# Patient Record
Sex: FEMALE | Race: WHITE | NOT HISPANIC OR LATINO | Employment: FULL TIME | URBAN - METROPOLITAN AREA
[De-identification: names, ages, dates, MRNs, and addresses within clinical notes are randomized per-mention and may not be internally consistent; named-entity substitution may affect disease eponyms.]

---

## 2019-08-13 ENCOUNTER — OFFICE VISIT (OUTPATIENT)
Dept: DERMATOLOGY | Facility: CLINIC | Age: 24
End: 2019-08-13
Payer: COMMERCIAL

## 2019-08-13 VITALS — HEART RATE: 101 BPM | SYSTOLIC BLOOD PRESSURE: 108 MMHG | OXYGEN SATURATION: 97 % | DIASTOLIC BLOOD PRESSURE: 73 MMHG

## 2019-08-13 DIAGNOSIS — L73.2 HIDRADENITIS SUPPURATIVA: Primary | ICD-10-CM

## 2019-08-13 PROCEDURE — 99203 OFFICE O/P NEW LOW 30 MIN: CPT | Performed by: DERMATOLOGY

## 2019-08-13 RX ORDER — CLINDAMYCIN AND BENZOYL PEROXIDE 10; 50 MG/G; MG/G
GEL TOPICAL 2 TIMES DAILY
Qty: 50 G | Refills: 11 | Status: SHIPPED | OUTPATIENT
Start: 2019-08-13 | End: 2021-02-15

## 2019-08-13 RX ORDER — TRAZODONE HYDROCHLORIDE 100 MG/1
100 TABLET ORAL
COMMUNITY
End: 2022-10-03

## 2019-08-13 RX ORDER — DOXYCYCLINE 100 MG/1
100 CAPSULE ORAL 2 TIMES DAILY
Qty: 60 CAPSULE | Refills: 3 | Status: SHIPPED | OUTPATIENT
Start: 2019-08-13 | End: 2021-02-18

## 2019-08-13 RX ORDER — SUMATRIPTAN 50 MG/1
50 TABLET, FILM COATED ORAL PRN
COMMUNITY
End: 2022-10-03

## 2019-08-13 NOTE — PROGRESS NOTES
Sugar Bonilla , a 23 year old year old female patient, I was asked to see by Antonina Miranda for hidradneitis.  Patient states this has been present for years.  Patient reports the following symptoms:  Cysts in groin and axilla.   .  Patient reports the following previous treatments clinda.  Patient reports the following modifying factors none.  Associated symptoms: none. She does smoke.  .  Patient has no other skin complaints today.  Remainder of the HPI, Meds, PMH, Allergies, FH, and SH was reviewed in chart.    No past medical history on file.    No past surgical history on file.     No family history on file.    Social History     Socioeconomic History     Marital status: Single     Spouse name: Not on file     Number of children: Not on file     Years of education: Not on file     Highest education level: Not on file   Occupational History     Not on file   Social Needs     Financial resource strain: Not on file     Food insecurity:     Worry: Not on file     Inability: Not on file     Transportation needs:     Medical: Not on file     Non-medical: Not on file   Tobacco Use     Smoking status: Not on file   Substance and Sexual Activity     Alcohol use: Not on file     Drug use: Not on file     Sexual activity: Not on file   Lifestyle     Physical activity:     Days per week: Not on file     Minutes per session: Not on file     Stress: Not on file   Relationships     Social connections:     Talks on phone: Not on file     Gets together: Not on file     Attends Church service: Not on file     Active member of club or organization: Not on file     Attends meetings of clubs or organizations: Not on file     Relationship status: Not on file     Intimate partner violence:     Fear of current or ex partner: Not on file     Emotionally abused: Not on file     Physically abused: Not on file     Forced sexual activity: Not on file   Other Topics Concern     Not on file   Social History Narrative     Not on file        No outpatient encounter medications on file as of 8/13/2019.     No facility-administered encounter medications on file as of 8/13/2019.              Review Of Systems  Skin: As above  Eyes: negative  Ears/Nose/Throat: negative  Respiratory: No shortness of breath, dyspnea on exertion, cough, or hemoptysis  Cardiovascular: negative  Gastrointestinal: negative  Genitourinary: negative  Musculoskeletal: negative  Neurologic: negative  Psychiatric: negative  Hematologic/Lymphatic/Immunologic: negative  Endocrine: negative      O:   NAD, WDWN, Alert & Oriented, Mood & Affect wnl, Vitals stable   Here today alone   There were no vitals taken for this visit.   General appearance arturo ii   Vitals stable   Alert, oriented and in no acute distress      Inflammatory nodules in axilla and ab     The remainder of expanded problem focused exam was unremarkable; the following areas were examined:  scalp/hair, conjunctiva/lids, face, neck, lips, chest, digits/nails, RUE, LUE.      Eyes: Conjunctivae/lids:Normal     ENT: Lips, buccal mucosa, tongue: normal    MSK:Normal    Cardiovascular: peripheral edema none    Pulm: Breathing Normal    Neuro/Psych: Orientation:Normal; Mood/Affect:Normal      A/P:  1. Hidradenitis  Weight loss d w  discharge smoking discussed with patient   hibiclens daily DO NOT PUT ON Face  Doxy twice daily  benzaclin twice daily    humira discussed with patient   Return to clinic 3 months  UV precautions reviewed with patient.    Skin care regimen reviewed with patient: Eliminate harsh soaps, i.e. Dial, zest, irsih spring; Mild soaps such as Cetaphil or Dove sensitive skin, avoid hot or cold showers, aggressive use of emollients including vanicream, cetaphil or cerave discussed with patient.

## 2019-08-13 NOTE — LETTER
8/13/2019         RE: Sugar Bonilla  [address]  [place] [address]        Dear Colleague,    Thank you for referring your patient, Sugar Bonilla, to the [facility]. Please see a copy of my visit note below.    Sugar Bonilla , a 23 year old year old female patient, I was asked to see by Antonina Miranda for hidradneitis.  Patient states this has been present for years.  Patient reports the following symptoms:  Cysts in groin and axilla.   .  Patient reports the following previous treatments clinda.  Patient reports the following modifying factors none.  Associated symptoms: none. She does smoke.  .  Patient has no other skin complaints today.  Remainder of the HPI, Meds, PMH, Allergies, FH, and SH was reviewed in chart.    No past medical history on file.    No past surgical history on file.     No family history on file.    Social History     Socioeconomic History     Marital status: Single     Spouse name: Not on file     Number of children: Not on file     Years of education: Not on file     Highest education level: Not on file   Occupational History     Not on file   Social Needs     Financial resource strain: Not on file     Food insecurity:     Worry: Not on file     Inability: Not on file     Transportation needs:     Medical: Not on file     Non-medical: Not on file   Tobacco Use     Smoking status: Not on file   Substance and Sexual Activity     Alcohol use: Not on file     Drug use: Not on file     Sexual activity: Not on file   Lifestyle     Physical activity:     Days per week: Not on file     Minutes per session: Not on file     Stress: Not on file   Relationships     Social connections:     Talks on phone: Not on file     Gets together: Not on file     Attends Sikh service: Not on file     Active member of club or organization: Not on file     Attends meetings of clubs or organizations: Not on file     Relationship status: Not on file     Intimate partner  violence:     Fear of current or ex partner: Not on file     Emotionally abused: Not on file     Physically abused: Not on file     Forced sexual activity: Not on file   Other Topics Concern     Not on file   Social History Narrative     Not on file       No outpatient encounter medications on file as of 8/13/2019.     No facility-administered encounter medications on file as of 8/13/2019.              Review Of Systems  Skin: As above  Eyes: negative  Ears/Nose/Throat: negative  Respiratory: No shortness of breath, dyspnea on exertion, cough, or hemoptysis  Cardiovascular: negative  Gastrointestinal: negative  Genitourinary: negative  Musculoskeletal: negative  Neurologic: negative  Psychiatric: negative  Hematologic/Lymphatic/Immunologic: negative  Endocrine: negative      O:   NAD, WDWN, Alert & Oriented, Mood & Affect wnl, Vitals stable   Here today alone   There were no vitals taken for this visit.   General appearance arturo ii   Vitals stable   Alert, oriented and in no acute distress      Inflammatory nodules in axilla and ab     The remainder of expanded problem focused exam was unremarkable; the following areas were examined:  scalp/hair, conjunctiva/lids, face, neck, lips, chest, digits/nails, RUE, LUE.      Eyes: Conjunctivae/lids:Normal     ENT: Lips, buccal mucosa, tongue: normal    MSK:Normal    Cardiovascular: peripheral edema none    Pulm: Breathing Normal    Neuro/Psych: Orientation:Normal; Mood/Affect:Normal      A/P:  1. Hidradenitis  Weight loss d w  discharge smoking discussed with patient   hibiclens daily DO NOT PUT ON Face  Doxy twice daily  benzaclin twice daily    humira discussed with patient   Return to clinic 3 months  UV precautions reviewed with patient.    Skin care regimen reviewed with patient: Eliminate harsh soaps, i.e. Dial, zest, irsih spring; Mild soaps such as Cetaphil or Dove sensitive skin, avoid hot or cold showers, aggressive use of emollients including vanicream, cetaphil  or cerave discussed with patient.        Again, thank you for allowing me to participate in the care of your patient.        Sincerely,        Wyatt Washington MD

## 2019-08-13 NOTE — PATIENT INSTRUCTIONS
-Begin Hibiclens scrub daily in the shower as directed DO NOT USE ON FACE  -Begin Benzaclin gel twice daily  -Begin doxycycline pills twice daily - TAKE WITH FOOD TO AVOID STOMACH UPSET  -Recheck in 3 months

## 2019-11-13 ENCOUNTER — TELEPHONE (OUTPATIENT)
Dept: DERMATOLOGY | Facility: CLINIC | Age: 24
End: 2019-11-13

## 2019-11-13 ENCOUNTER — OFFICE VISIT (OUTPATIENT)
Dept: DERMATOLOGY | Facility: CLINIC | Age: 24
End: 2019-11-13
Payer: COMMERCIAL

## 2019-11-13 VITALS — OXYGEN SATURATION: 96 % | SYSTOLIC BLOOD PRESSURE: 112 MMHG | HEART RATE: 99 BPM | DIASTOLIC BLOOD PRESSURE: 67 MMHG

## 2019-11-13 DIAGNOSIS — L74.510 AXILLARY HYPERHIDROSIS: Primary | ICD-10-CM

## 2019-11-13 DIAGNOSIS — L73.2 HIDRADENITIS SUPPURATIVA: ICD-10-CM

## 2019-11-13 DIAGNOSIS — Z79.899 ENCOUNTER FOR LONG-TERM (CURRENT) USE OF HIGH-RISK MEDICATION: Primary | ICD-10-CM

## 2019-11-13 DIAGNOSIS — L74.510 AXILLARY HYPERHIDROSIS: ICD-10-CM

## 2019-11-13 PROCEDURE — 36415 COLL VENOUS BLD VENIPUNCTURE: CPT | Performed by: DERMATOLOGY

## 2019-11-13 PROCEDURE — 99213 OFFICE O/P EST LOW 20 MIN: CPT | Performed by: DERMATOLOGY

## 2019-11-13 PROCEDURE — 86481 TB AG RESPONSE T-CELL SUSP: CPT | Performed by: DERMATOLOGY

## 2019-11-13 PROCEDURE — 86803 HEPATITIS C AB TEST: CPT | Performed by: DERMATOLOGY

## 2019-11-13 NOTE — TELEPHONE ENCOUNTER
PA Initiation    Medication: humira pa pending   Insurance Company: [employer] - Phone [phone] Fax [phone]  Pharmacy Filling the Rx: [facility] - [place], [place] - [facility] [address]  Filling Pharmacy Phone:    Filling Pharmacy Fax:    Start Date: 11/13/2019

## 2019-11-13 NOTE — PROGRESS NOTES
Sugar Bonilla is a 23 year old year old female patient here today for f/u hidradneitis, couldn't tolerate doxy, using hibiclens and benzaclin which have helped axilla, but now sweating a lot in axilla.  Still haslesions in groin.    Patient reports the following modifying factors none.  Associated symptoms: none.  Patient has no other skin complaints today.  Remainder of the HPI, Meds, PMH, Allergies, FH, and SH was reviewed in chart.    History reviewed. No pertinent past medical history.    History reviewed. No pertinent surgical history.     History reviewed. No pertinent family history.    Social History     Socioeconomic History     Marital status: Single     Spouse name: Not on file     Number of children: Not on file     Years of education: Not on file     Highest education level: Not on file   Occupational History     Not on file   Social Needs     Financial resource strain: Not on file     Food insecurity:     Worry: Not on file     Inability: Not on file     Transportation needs:     Medical: Not on file     Non-medical: Not on file   Tobacco Use     Smoking status: Current Every Day Smoker     Packs/day: 0.50     Types: Cigarettes     Smokeless tobacco: Never Used   Substance and Sexual Activity     Alcohol use: Not on file     Drug use: Not on file     Sexual activity: Not on file   Lifestyle     Physical activity:     Days per week: Not on file     Minutes per session: Not on file     Stress: Not on file   Relationships     Social connections:     Talks on phone: Not on file     Gets together: Not on file     Attends Restorationist service: Not on file     Active member of club or organization: Not on file     Attends meetings of clubs or organizations: Not on file     Relationship status: Not on file     Intimate partner violence:     Fear of current or ex partner: Not on file     Emotionally abused: Not on file     Physically abused: Not on file     Forced sexual activity: Not on file   Other Topics  Concern     Not on file   Social History Narrative     Not on file       Outpatient Encounter Medications as of 11/13/2019   Medication Sig Dispense Refill     chlorhexidine (HIBICLENS) 4 % liquid Apply topically daily as needed for wound care Wash axilla, groin daily DO NOT PUT ON FACE 946 mL 11     clindamycin-benzoyl peroxide (BENZACLIN) 1-5 % external gel Apply topically 2 times daily To axilla and groin 50 g 11     FLUoxetine (PROZAC) 20 MG capsule Take 20 mg by mouth       SUMAtriptan (IMITREX) 50 MG tablet Take 50 mg by mouth       traZODone (DESYREL) 100 MG tablet Take 100 mg by mouth       doxycycline monohydrate (MONODOX) 100 MG capsule Take 1 capsule (100 mg) by mouth 2 times daily (Patient not taking: Reported on 11/13/2019) 60 capsule 3     No facility-administered encounter medications on file as of 11/13/2019.              Review Of Systems  Skin: As above  Eyes: negative  Ears/Nose/Throat: negative  Respiratory: No shortness of breath, dyspnea on exertion, cough, or hemoptysis  Cardiovascular: negative  Gastrointestinal: negative  Genitourinary: negative  Musculoskeletal: negative  Neurologic: negative  Psychiatric: negative  Hematologic/Lymphatic/Immunologic: negative  Endocrine: negative      O:   NAD, WDWN, Alert & Oriented, Mood & Affect wnl, Vitals stable   Here today alone   /67   Pulse 99   SpO2 96%    General appearance normal   Vitals stable   Alert, oriented and in no acute distress     Groin lesions per patient  Axillary seating     The remainder of expanded problem focused exam was unremarkable; the following areas were examined:  scalp/hair, conjunctiva/lids, face, neck, lips, chest, digits/nails, RUE, LUE.      Eyes: Conjunctivae/lids:Normal     ENT: Lips, buccal mucosa, tongue: normal    MSK:Normal    Cardiovascular: peripheral edema none    Pulm: Breathing Normal    Lymph Nodes: No Head and Neck Lymphadenopathy     Neuro/Psych: Orientation:Normal;  Mood/Affect:Normal      A/P:  1. Hidradneitis  Switching abx and humira discussed with patient   roisk of cancer, lymphoma and infection discussed with patient   Check hep b, c, tb today  Start humira  Return to clinic 8 weeks  2. Axillary seating botox discussed with patient   She will check with insurance

## 2019-11-13 NOTE — LETTER
11/13/2019         RE: Sugar Bonilla  [address]  [place] [address]        Dear Colleague,    Thank you for referring your patient, Sugar Bonilla, to the [facility]. Please see a copy of my visit note below.    Sugar Bonilla is a 23 year old year old female patient here today for f/u hidradneitis, couldn't tolerate doxy, using hibiclens and benzaclin which have helped axilla, but now sweating a lot in axilla.  Still haslesions in groin.    Patient reports the following modifying factors none.  Associated symptoms: none.  Patient has no other skin complaints today.  Remainder of the HPI, Meds, PMH, Allergies, FH, and SH was reviewed in chart.    History reviewed. No pertinent past medical history.    History reviewed. No pertinent surgical history.     History reviewed. No pertinent family history.    Social History     Socioeconomic History     Marital status: Single     Spouse name: Not on file     Number of children: Not on file     Years of education: Not on file     Highest education level: Not on file   Occupational History     Not on file   Social Needs     Financial resource strain: Not on file     Food insecurity:     Worry: Not on file     Inability: Not on file     Transportation needs:     Medical: Not on file     Non-medical: Not on file   Tobacco Use     Smoking status: Current Every Day Smoker     Packs/day: 0.50     Types: Cigarettes     Smokeless tobacco: Never Used   Substance and Sexual Activity     Alcohol use: Not on file     Drug use: Not on file     Sexual activity: Not on file   Lifestyle     Physical activity:     Days per week: Not on file     Minutes per session: Not on file     Stress: Not on file   Relationships     Social connections:     Talks on phone: Not on file     Gets together: Not on file     Attends Voodoo service: Not on file     Active member of club or organization: Not on file     Attends meetings of clubs or organizations: Not on  file     Relationship status: Not on file     Intimate partner violence:     Fear of current or ex partner: Not on file     Emotionally abused: Not on file     Physically abused: Not on file     Forced sexual activity: Not on file   Other Topics Concern     Not on file   Social History Narrative     Not on file       Outpatient Encounter Medications as of 11/13/2019   Medication Sig Dispense Refill     chlorhexidine (HIBICLENS) 4 % liquid Apply topically daily as needed for wound care Wash axilla, groin daily DO NOT PUT ON FACE 946 mL 11     clindamycin-benzoyl peroxide (BENZACLIN) 1-5 % external gel Apply topically 2 times daily To axilla and groin 50 g 11     FLUoxetine (PROZAC) 20 MG capsule Take 20 mg by mouth       SUMAtriptan (IMITREX) 50 MG tablet Take 50 mg by mouth       traZODone (DESYREL) 100 MG tablet Take 100 mg by mouth       doxycycline monohydrate (MONODOX) 100 MG capsule Take 1 capsule (100 mg) by mouth 2 times daily (Patient not taking: Reported on 11/13/2019) 60 capsule 3     No facility-administered encounter medications on file as of 11/13/2019.              Review Of Systems  Skin: As above  Eyes: negative  Ears/Nose/Throat: negative  Respiratory: No shortness of breath, dyspnea on exertion, cough, or hemoptysis  Cardiovascular: negative  Gastrointestinal: negative  Genitourinary: negative  Musculoskeletal: negative  Neurologic: negative  Psychiatric: negative  Hematologic/Lymphatic/Immunologic: negative  Endocrine: negative      O:   NAD, WDWN, Alert & Oriented, Mood & Affect wnl, Vitals stable   Here today alone   /67   Pulse 99   SpO2 96%    General appearance normal   Vitals stable   Alert, oriented and in no acute distress     Groin lesions per patient  Axillary seating     The remainder of expanded problem focused exam was unremarkable; the following areas were examined:  scalp/hair, conjunctiva/lids, face, neck, lips, chest, digits/nails, RUE, LUE.      Eyes:  Conjunctivae/lids:Normal     ENT: Lips, buccal mucosa, tongue: normal    MSK:Normal    Cardiovascular: peripheral edema none    Pulm: Breathing Normal    Lymph Nodes: No Head and Neck Lymphadenopathy     Neuro/Psych: Orientation:Normal; Mood/Affect:Normal      A/P:  1. Hidradneitis  Switching abx and humira discussed with patient   roisk of cancer, lymphoma and infection discussed with patient   Check hep b, c, tb today  Start humira  Return to clinic 8 weeks  2. Axillary seating botox discussed with patient   She will check with insurance      Again, thank you for allowing me to participate in the care of your patient.        Sincerely,        Wyatt Washington MD

## 2019-11-14 LAB — HCV AB SERPL QL IA: NONREACTIVE

## 2019-11-14 NOTE — TELEPHONE ENCOUNTER
HP faxed back request askin) is this request within the FDA approved dosing regimen of 100 units every 12 weeks? If not, please provide the requested dosing regimen and rationale for use beyond the FDA label.     2) has the pt had a trial and failure of Drysol? If not, please explain why Drysol cannot be used.     3) has the pt had trail and failure of topical products for excessive sweating? If so, what products has the pt used?

## 2019-11-14 NOTE — TELEPHONE ENCOUNTER
Spoke to patient and she has tried various over the counter antiperspirants, but has not tried Drysol...     So, per notes below, she will need to try and fail Drysol.    Uses [facility] pharmacy.    Fariba Fisher RN

## 2019-11-14 NOTE — TELEPHONE ENCOUNTER
Prior Authorization Approval    Authorization Effective Date: 10/13/2019  Authorization Expiration Date: 11/13/2021  Medication: humira pa approved  Approved Dose/Quantity: loading and maintenance   Reference #: [id number]   Insurance Company: [employer] - Phone [phone] Fax [phone]  Expected CoPay: $0     CoPay Card Available: No    Foundation Assistance Needed: na  Which Pharmacy is filling the prescription (Not needed for infusion/clinic administered): [facility] - [place] [address]  Pharmacy Notified: Yes  Patient Notified: Yes

## 2019-11-15 LAB
GAMMA INTERFERON BACKGROUND BLD IA-ACNC: 0.06 IU/ML
M TB IFN-G BLD-IMP: NEGATIVE
M TB IFN-G CD4+ BCKGRND COR BLD-ACNC: 9.65 IU/ML
MITOGEN IGNF BCKGRD COR BLD-ACNC: 0.01 IU/ML
MITOGEN IGNF BCKGRD COR BLD-ACNC: 0.02 IU/ML

## 2019-12-02 ENCOUNTER — TELEPHONE (OUTPATIENT)
Dept: DERMATOLOGY | Facility: CLINIC | Age: 24
End: 2019-12-02

## 2019-12-02 DIAGNOSIS — Z79.899 ENCOUNTER FOR LONG-TERM (CURRENT) USE OF HIGH-RISK MEDICATION: ICD-10-CM

## 2019-12-02 DIAGNOSIS — L74.510 AXILLARY HYPERHIDROSIS: ICD-10-CM

## 2019-12-02 DIAGNOSIS — L73.2 HIDRADENITIS SUPPURATIVA: ICD-10-CM

## 2019-12-02 NOTE — TELEPHONE ENCOUNTER
Reason for Call:  Other call back    Detailed comments: [facility] calling.  [facility] rx sent over today - pt already received starter rx on 11-20.  They have one refill of the maintenance on file already.  States just received another rx for the starter - duplicate?    Phone Number Patient can be reached at: [phone] Manoj    Best Time:     Can we leave a detailed message on this number? YES    Call taken on 12/2/2019 at 4:37 PM by Emilee Augustine

## 2019-12-03 NOTE — TELEPHONE ENCOUNTER
Humira CF 40 mg once a week called to pharmacy per Dr. Washington note below and updated rx in Epic as well. Fariba Fisher RN

## 2019-12-11 ENCOUNTER — MYC MEDICAL ADVICE (OUTPATIENT)
Dept: DERMATOLOGY | Facility: CLINIC | Age: 24
End: 2019-12-11

## 2019-12-11 NOTE — TELEPHONE ENCOUNTER
WE sent the humira    If you feel like you are getting a cold I would check woith pcp      As for the drysol if it does not work, the other options include oral robinul or botox injections

## 2019-12-11 NOTE — TELEPHONE ENCOUNTER
See My chart message. CF Humira version was called to pharmacy last week per 12-2-19 Phone encounter. I can call pharmacy again to clarify.    Please advise regarding patient's note below and cold symptoms. Fariba Fisher RN

## 2020-01-22 ENCOUNTER — MYC MEDICAL ADVICE (OUTPATIENT)
Dept: DERMATOLOGY | Facility: CLINIC | Age: 25
End: 2020-01-22

## 2020-01-22 ENCOUNTER — OFFICE VISIT (OUTPATIENT)
Dept: DERMATOLOGY | Facility: CLINIC | Age: 25
End: 2020-01-22
Payer: COMMERCIAL

## 2020-01-22 VITALS — HEART RATE: 101 BPM | SYSTOLIC BLOOD PRESSURE: 112 MMHG | DIASTOLIC BLOOD PRESSURE: 70 MMHG | OXYGEN SATURATION: 98 %

## 2020-01-22 DIAGNOSIS — L74.510 AXILLARY HYPERHIDROSIS: ICD-10-CM

## 2020-01-22 DIAGNOSIS — L73.2 HIDRADENITIS SUPPURATIVA: Primary | ICD-10-CM

## 2020-01-22 DIAGNOSIS — Z79.899 ENCOUNTER FOR LONG-TERM (CURRENT) USE OF HIGH-RISK MEDICATION: ICD-10-CM

## 2020-01-22 LAB
ALBUMIN SERPL-MCNC: 3.9 G/DL (ref 3.4–5)
ALP SERPL-CCNC: 73 U/L (ref 40–150)
ALT SERPL W P-5'-P-CCNC: 33 U/L (ref 0–50)
ANION GAP SERPL CALCULATED.3IONS-SCNC: 8 MMOL/L (ref 3–14)
AST SERPL W P-5'-P-CCNC: 17 U/L (ref 0–45)
BILIRUB SERPL-MCNC: 0.5 MG/DL (ref 0.2–1.3)
BUN SERPL-MCNC: 15 MG/DL (ref 7–30)
CALCIUM SERPL-MCNC: 9.4 MG/DL (ref 8.5–10.1)
CHLORIDE SERPL-SCNC: 108 MMOL/L (ref 94–109)
CO2 SERPL-SCNC: 24 MMOL/L (ref 20–32)
CREAT SERPL-MCNC: 0.97 MG/DL (ref 0.52–1.04)
ERYTHROCYTE [DISTWIDTH] IN BLOOD BY AUTOMATED COUNT: 12.6 % (ref 10–15)
GFR SERPL CREATININE-BSD FRML MDRD: 81 ML/MIN/{1.73_M2}
GLUCOSE SERPL-MCNC: 78 MG/DL (ref 70–99)
HCT VFR BLD AUTO: 45.5 % (ref 35–47)
HGB BLD-MCNC: 14.9 G/DL (ref 11.7–15.7)
MCH RBC QN AUTO: 30.6 PG (ref 26.5–33)
MCHC RBC AUTO-ENTMCNC: 32.7 G/DL (ref 31.5–36.5)
MCV RBC AUTO: 93 FL (ref 78–100)
PLATELET # BLD AUTO: 313 10E9/L (ref 150–450)
POTASSIUM SERPL-SCNC: 3.6 MMOL/L (ref 3.4–5.3)
PROT SERPL-MCNC: 7.7 G/DL (ref 6.8–8.8)
RBC # BLD AUTO: 4.87 10E12/L (ref 3.8–5.2)
SODIUM SERPL-SCNC: 140 MMOL/L (ref 133–144)
WBC # BLD AUTO: 11.6 10E9/L (ref 4–11)

## 2020-01-22 PROCEDURE — 85027 COMPLETE CBC AUTOMATED: CPT | Performed by: DERMATOLOGY

## 2020-01-22 PROCEDURE — 36415 COLL VENOUS BLD VENIPUNCTURE: CPT | Performed by: DERMATOLOGY

## 2020-01-22 PROCEDURE — 99213 OFFICE O/P EST LOW 20 MIN: CPT | Performed by: DERMATOLOGY

## 2020-01-22 PROCEDURE — 80053 COMPREHEN METABOLIC PANEL: CPT | Performed by: DERMATOLOGY

## 2020-01-22 NOTE — LETTER
1/22/2020         RE: Sugar Bonilla  [address]  [place] [address]        Dear Colleague,    Thank you for referring your patient, Sugar Bonilla, to the [facility]. Please see a copy of my visit note below.    Sugar Bonilla is a 24 year old year old female patient here today for f/u hidradenitis, doing great on humira, no issues!! Clear!!Patient has no other skin complaints today.  Remainder of the HPI, Meds, PMH, Allergies, FH, and SH was reviewed in chart.  Drysol has not worked for sweating.  Wants to do botox.   History reviewed. No pertinent past medical history.    History reviewed. No pertinent surgical history.     History reviewed. No pertinent family history.    Social History     Socioeconomic History     Marital status: Single     Spouse name: Not on file     Number of children: Not on file     Years of education: Not on file     Highest education level: Not on file   Occupational History     Not on file   Social Needs     Financial resource strain: Not on file     Food insecurity:     Worry: Not on file     Inability: Not on file     Transportation needs:     Medical: Not on file     Non-medical: Not on file   Tobacco Use     Smoking status: Current Every Day Smoker     Packs/day: 0.50     Types: Cigarettes     Smokeless tobacco: Never Used   Substance and Sexual Activity     Alcohol use: Not on file     Drug use: Not on file     Sexual activity: Not on file   Lifestyle     Physical activity:     Days per week: Not on file     Minutes per session: Not on file     Stress: Not on file   Relationships     Social connections:     Talks on phone: Not on file     Gets together: Not on file     Attends Advent service: Not on file     Active member of club or organization: Not on file     Attends meetings of clubs or organizations: Not on file     Relationship status: Not on file     Intimate partner violence:     Fear of current or ex partner: Not on file      Emotionally abused: Not on file     Physically abused: Not on file     Forced sexual activity: Not on file   Other Topics Concern     Not on file   Social History Narrative     Not on file       Outpatient Encounter Medications as of 1/22/2020   Medication Sig Dispense Refill     adalimumab (HUMIRA *CF*) 80 MG/0.8ML & 40MG/0.4ML pen kit Inject 40 mg subcutaneous once weekly. 1 each 1     adalimumab (HUMIRA) 40 MG/0.8ML prefilled syringe kit 40 mg injections on Day 1, 80 mg two weeks later (Day 15). Begin 40 mg weekly dosing two weeks later (Day 29). 12 Syringe 3     aluminum chloride (DRYSOL) 20 % external solution Apply topically At Bedtime 60 mL 3     FLUoxetine (PROZAC) 20 MG capsule Take 20 mg by mouth       SUMAtriptan (IMITREX) 50 MG tablet Take 50 mg by mouth       traZODone (DESYREL) 100 MG tablet Take 100 mg by mouth       chlorhexidine (HIBICLENS) 4 % liquid Apply topically daily as needed for wound care Wash axilla, groin daily DO NOT PUT ON FACE (Patient not taking: Reported on 1/22/2020) 946 mL 11     clindamycin-benzoyl peroxide (BENZACLIN) 1-5 % external gel Apply topically 2 times daily To axilla and groin (Patient not taking: Reported on 1/22/2020) 50 g 11     doxycycline monohydrate (MONODOX) 100 MG capsule Take 1 capsule (100 mg) by mouth 2 times daily (Patient not taking: Reported on 1/22/2020) 60 capsule 3     No facility-administered encounter medications on file as of 1/22/2020.              Review Of Systems  Skin: As above  Eyes: negative  Ears/Nose/Throat: negative  Respiratory: No shortness of breath, dyspnea on exertion, cough, or hemoptysis  Cardiovascular: negative  Gastrointestinal: negative  Genitourinary: negative  Musculoskeletal: negative  Neurologic: negative  Psychiatric: negative  Hematologic/Lymphatic/Immunologic: negative  Endocrine: negative      O:   NAD, WDWN, Alert & Oriented, Mood & Affect wnl, Vitals stable   Here today alone   /70   Pulse 101   SpO2 98%     General appearance normal   Vitals stable   Alert, oriented and in no acute distress     pih in axilla     The remainder of expanded problem focused exam was unremarkable; the following areas were examined:  scalp/hair, conjunctiva/lids, face, neck, lips, chest, digits/nails, RUE, LUE.      Eyes: Conjunctivae/lids:Normal     ENT: Lips, buccal mucosa, tongue: normal    MSK:Normal    Cardiovascular: peripheral edema none    Pulm: Breathing Normal    Lymph Nodes: No Head and Neck Lymphadenopathy     Neuro/Psych: Orientation:Alert and Orientedx3 ; Mood/Affect:normal       A/P:  1. hiadraadenitis stable on humira  Check cbc and cmp  Doing great  2. Hyperhidrosis   botox discussed with patient   She will check with insurance   Return to clinic 6 months for humira nad prn for botox       Again, thank you for allowing me to participate in the care of your patient.        Sincerely,        Wyatt Washington MD

## 2020-01-22 NOTE — PROGRESS NOTES
Sugar Bonilla is a 24 year old year old female patient here today for f/u hidradenitis, doing great on humira, no issues!! Clear!!Patient has no other skin complaints today.  Remainder of the HPI, Meds, PMH, Allergies, FH, and SH was reviewed in chart.  Drysol has not worked for sweating.  Wants to do botox.   History reviewed. No pertinent past medical history.    History reviewed. No pertinent surgical history.     History reviewed. No pertinent family history.    Social History     Socioeconomic History     Marital status: Single     Spouse name: Not on file     Number of children: Not on file     Years of education: Not on file     Highest education level: Not on file   Occupational History     Not on file   Social Needs     Financial resource strain: Not on file     Food insecurity:     Worry: Not on file     Inability: Not on file     Transportation needs:     Medical: Not on file     Non-medical: Not on file   Tobacco Use     Smoking status: Current Every Day Smoker     Packs/day: 0.50     Types: Cigarettes     Smokeless tobacco: Never Used   Substance and Sexual Activity     Alcohol use: Not on file     Drug use: Not on file     Sexual activity: Not on file   Lifestyle     Physical activity:     Days per week: Not on file     Minutes per session: Not on file     Stress: Not on file   Relationships     Social connections:     Talks on phone: Not on file     Gets together: Not on file     Attends Roman Catholic service: Not on file     Active member of club or organization: Not on file     Attends meetings of clubs or organizations: Not on file     Relationship status: Not on file     Intimate partner violence:     Fear of current or ex partner: Not on file     Emotionally abused: Not on file     Physically abused: Not on file     Forced sexual activity: Not on file   Other Topics Concern     Not on file   Social History Narrative     Not on file       Outpatient Encounter Medications as of 1/22/2020    Medication Sig Dispense Refill     adalimumab (HUMIRA *CF*) 80 MG/0.8ML & 40MG/0.4ML pen kit Inject 40 mg subcutaneous once weekly. 1 each 1     adalimumab (HUMIRA) 40 MG/0.8ML prefilled syringe kit 40 mg injections on Day 1, 80 mg two weeks later (Day 15). Begin 40 mg weekly dosing two weeks later (Day 29). 12 Syringe 3     aluminum chloride (DRYSOL) 20 % external solution Apply topically At Bedtime 60 mL 3     FLUoxetine (PROZAC) 20 MG capsule Take 20 mg by mouth       SUMAtriptan (IMITREX) 50 MG tablet Take 50 mg by mouth       traZODone (DESYREL) 100 MG tablet Take 100 mg by mouth       chlorhexidine (HIBICLENS) 4 % liquid Apply topically daily as needed for wound care Wash axilla, groin daily DO NOT PUT ON FACE (Patient not taking: Reported on 1/22/2020) 946 mL 11     clindamycin-benzoyl peroxide (BENZACLIN) 1-5 % external gel Apply topically 2 times daily To axilla and groin (Patient not taking: Reported on 1/22/2020) 50 g 11     doxycycline monohydrate (MONODOX) 100 MG capsule Take 1 capsule (100 mg) by mouth 2 times daily (Patient not taking: Reported on 1/22/2020) 60 capsule 3     No facility-administered encounter medications on file as of 1/22/2020.              Review Of Systems  Skin: As above  Eyes: negative  Ears/Nose/Throat: negative  Respiratory: No shortness of breath, dyspnea on exertion, cough, or hemoptysis  Cardiovascular: negative  Gastrointestinal: negative  Genitourinary: negative  Musculoskeletal: negative  Neurologic: negative  Psychiatric: negative  Hematologic/Lymphatic/Immunologic: negative  Endocrine: negative      O:   NAD, WDWN, Alert & Oriented, Mood & Affect wnl, Vitals stable   Here today alone   /70   Pulse 101   SpO2 98%    General appearance normal   Vitals stable   Alert, oriented and in no acute distress     pih in axilla     The remainder of expanded problem focused exam was unremarkable; the following areas were examined:  scalp/hair, conjunctiva/lids, face, neck,  lips, chest, digits/nails, RUE, LUE.      Eyes: Conjunctivae/lids:Normal     ENT: Lips, buccal mucosa, tongue: normal    MSK:Normal    Cardiovascular: peripheral edema none    Pulm: Breathing Normal    Lymph Nodes: No Head and Neck Lymphadenopathy     Neuro/Psych: Orientation:Alert and Orientedx3 ; Mood/Affect:normal       A/P:  1. hiadraadenitis stable on humira  Check cbc and cmp  Doing great  2. Hyperhidrosis   botox discussed with patient   She will check with insurance   Return to clinic 6 months for humira nad prn for botox

## 2020-01-23 NOTE — TELEPHONE ENCOUNTER
Ins needs to know is this request for BOTOX within the FDA approved dosing regimen of 100 units every 12 weeks? If not, please provide the requested dosing regimen and rationale for use beyond the FDA label.     Maria Guadalupe Salgado  [facility] CSS

## 2020-01-24 NOTE — TELEPHONE ENCOUNTER
PA for BOTOX approved for 100 units every 12 weeks starting 1/23/2020 to 1/23/2021 qty 96614.0  Sent to scanning     Maria Guadalupe Salgado  Specialty CSS

## 2020-02-19 ENCOUNTER — OFFICE VISIT (OUTPATIENT)
Dept: DERMATOLOGY | Facility: CLINIC | Age: 25
End: 2020-02-19
Payer: COMMERCIAL

## 2020-02-19 VITALS — SYSTOLIC BLOOD PRESSURE: 120 MMHG | DIASTOLIC BLOOD PRESSURE: 69 MMHG | OXYGEN SATURATION: 99 % | HEART RATE: 90 BPM

## 2020-02-19 DIAGNOSIS — L74.510 AXILLARY HYPERHIDROSIS: Primary | ICD-10-CM

## 2020-02-19 PROCEDURE — 64650 CHEMODENERV ECCRINE GLANDS: CPT | Performed by: DERMATOLOGY

## 2020-02-19 PROCEDURE — 99207 C BOTOX 22-25 UNITS WRVU'S ONLY: CPT | Performed by: DERMATOLOGY

## 2020-02-19 NOTE — PROGRESS NOTES
Sugar Bonilla is a 24 year old year old female patient here today for botox for axillary hyperhidrosis, approved by Insurance for 100 units total. Patient has no other skin complaints today.  Remainder of the HPI, Meds, PMH, Allergies, FH, and SH was reviewed in chart.    History reviewed. No pertinent past medical history.    History reviewed. No pertinent surgical history.     History reviewed. No pertinent family history.    Social History     Socioeconomic History     Marital status: Single     Spouse name: Not on file     Number of children: Not on file     Years of education: Not on file     Highest education level: Not on file   Occupational History     Not on file   Social Needs     Financial resource strain: Not on file     Food insecurity:     Worry: Not on file     Inability: Not on file     Transportation needs:     Medical: Not on file     Non-medical: Not on file   Tobacco Use     Smoking status: Current Every Day Smoker     Packs/day: 0.50     Types: Cigarettes     Smokeless tobacco: Never Used   Substance and Sexual Activity     Alcohol use: Not on file     Drug use: Not on file     Sexual activity: Not on file   Lifestyle     Physical activity:     Days per week: Not on file     Minutes per session: Not on file     Stress: Not on file   Relationships     Social connections:     Talks on phone: Not on file     Gets together: Not on file     Attends Spiritism service: Not on file     Active member of club or organization: Not on file     Attends meetings of clubs or organizations: Not on file     Relationship status: Not on file     Intimate partner violence:     Fear of current or ex partner: Not on file     Emotionally abused: Not on file     Physically abused: Not on file     Forced sexual activity: Not on file   Other Topics Concern     Not on file   Social History Narrative     Not on file       Outpatient Encounter Medications as of 2/19/2020   Medication Sig Dispense Refill     adalimumab  (HUMIRA *CF*) 80 MG/0.8ML & 40MG/0.4ML pen kit Inject 40 mg subcutaneous once weekly. 1 each 1     adalimumab (HUMIRA) 40 MG/0.8ML prefilled syringe kit 40 mg injections on Day 1, 80 mg two weeks later (Day 15). Begin 40 mg weekly dosing two weeks later (Day 29). 12 Syringe 3     aluminum chloride (DRYSOL) 20 % external solution Apply topically At Bedtime 60 mL 3     FLUoxetine (PROZAC) 20 MG capsule Take 20 mg by mouth       SUMAtriptan (IMITREX) 50 MG tablet Take 50 mg by mouth       traZODone (DESYREL) 100 MG tablet Take 100 mg by mouth       chlorhexidine (HIBICLENS) 4 % liquid Apply topically daily as needed for wound care Wash axilla, groin daily DO NOT PUT ON FACE (Patient not taking: Reported on 1/22/2020) 946 mL 11     clindamycin-benzoyl peroxide (BENZACLIN) 1-5 % external gel Apply topically 2 times daily To axilla and groin (Patient not taking: Reported on 1/22/2020) 50 g 11     doxycycline monohydrate (MONODOX) 100 MG capsule Take 1 capsule (100 mg) by mouth 2 times daily (Patient not taking: Reported on 1/22/2020) 60 capsule 3     No facility-administered encounter medications on file as of 2/19/2020.              Review Of Systems  Skin: As above  Eyes: negative  Ears/Nose/Throat: negative  Respiratory: No shortness of breath, dyspnea on exertion, cough, or hemoptysis  Cardiovascular: negative  Gastrointestinal: negative  Genitourinary: negative  Musculoskeletal: negative  Neurologic: negative  Psychiatric: negative  Hematologic/Lymphatic/Immunologic: negative  Endocrine: negative      O:   NAD, WDWN, Alert & Oriented, Mood & Affect wnl, Vitals stable   Here today alone   /69   Pulse 90   SpO2 99%    General appearance normal   Vitals stable   Alert, oriented and in no acute distress     Sweating in axilla      Eyes: Conjunctivae/lids:Normal     ENT: Lips, buccal mucosa, tongue: normal    MSK:Normal    Cardiovascular: peripheral edema none    Pulm: Breathing Normal    Neuro/Psych:  Orientation:Alert and Orientedx3 ; Mood/Affect:normal       A/P:  1. Axillary hyperhidrosis  BOTOX:  PGACAC discussed.  Risks including but not limited to injection site reaction, bruising, asymmetry, no resolution of sweating, dry mouth, tiredness, and headache.  Also label warnings are difficulty with swallowing, speaking, breathing, muscle weakness, loss of bladder control, and double vision/blurred vision.  Explained confirmed report of spread of toxin effect when used for hyperhidrosis of underarms or cosmetic use on face has not been reported.  All questions answered and entertained to patient s satisfaction.  Informed consent obtained.      Botox-A in concentration of 2.5u/0.1cc was injected ID to R and L axilla 50 units each, 100 units total.  Patient tolerated without complications and given wound care instructions, including not to move product around.  Return in 2 weeks for follow-up and possible additional botox if needed.    [id number] 8/2021  [id number] 3/2022

## 2020-02-19 NOTE — LETTER
2/19/2020         RE: Sugar Bonilla  [address]  [place] [address]        Dear Colleague,    Thank you for referring your patient, Sugar Bonilla, to the [facility]. Please see a copy of my visit note below.    Sugar Bonilla is a 24 year old year old female patient here today for botox for axillary hyperhidrosis, approved by Insurance for 100 units total. Patient has no other skin complaints today.  Remainder of the HPI, Meds, PMH, Allergies, FH, and SH was reviewed in chart.    History reviewed. No pertinent past medical history.    History reviewed. No pertinent surgical history.     History reviewed. No pertinent family history.    Social History     Socioeconomic History     Marital status: Single     Spouse name: Not on file     Number of children: Not on file     Years of education: Not on file     Highest education level: Not on file   Occupational History     Not on file   Social Needs     Financial resource strain: Not on file     Food insecurity:     Worry: Not on file     Inability: Not on file     Transportation needs:     Medical: Not on file     Non-medical: Not on file   Tobacco Use     Smoking status: Current Every Day Smoker     Packs/day: 0.50     Types: Cigarettes     Smokeless tobacco: Never Used   Substance and Sexual Activity     Alcohol use: Not on file     Drug use: Not on file     Sexual activity: Not on file   Lifestyle     Physical activity:     Days per week: Not on file     Minutes per session: Not on file     Stress: Not on file   Relationships     Social connections:     Talks on phone: Not on file     Gets together: Not on file     Attends Sikhism service: Not on file     Active member of club or organization: Not on file     Attends meetings of clubs or organizations: Not on file     Relationship status: Not on file     Intimate partner violence:     Fear of current or ex partner: Not on file     Emotionally abused: Not on file      Physically abused: Not on file     Forced sexual activity: Not on file   Other Topics Concern     Not on file   Social History Narrative     Not on file       Outpatient Encounter Medications as of 2/19/2020   Medication Sig Dispense Refill     adalimumab (HUMIRA *CF*) 80 MG/0.8ML & 40MG/0.4ML pen kit Inject 40 mg subcutaneous once weekly. 1 each 1     adalimumab (HUMIRA) 40 MG/0.8ML prefilled syringe kit 40 mg injections on Day 1, 80 mg two weeks later (Day 15). Begin 40 mg weekly dosing two weeks later (Day 29). 12 Syringe 3     aluminum chloride (DRYSOL) 20 % external solution Apply topically At Bedtime 60 mL 3     FLUoxetine (PROZAC) 20 MG capsule Take 20 mg by mouth       SUMAtriptan (IMITREX) 50 MG tablet Take 50 mg by mouth       traZODone (DESYREL) 100 MG tablet Take 100 mg by mouth       chlorhexidine (HIBICLENS) 4 % liquid Apply topically daily as needed for wound care Wash axilla, groin daily DO NOT PUT ON FACE (Patient not taking: Reported on 1/22/2020) 946 mL 11     clindamycin-benzoyl peroxide (BENZACLIN) 1-5 % external gel Apply topically 2 times daily To axilla and groin (Patient not taking: Reported on 1/22/2020) 50 g 11     doxycycline monohydrate (MONODOX) 100 MG capsule Take 1 capsule (100 mg) by mouth 2 times daily (Patient not taking: Reported on 1/22/2020) 60 capsule 3     No facility-administered encounter medications on file as of 2/19/2020.              Review Of Systems  Skin: As above  Eyes: negative  Ears/Nose/Throat: negative  Respiratory: No shortness of breath, dyspnea on exertion, cough, or hemoptysis  Cardiovascular: negative  Gastrointestinal: negative  Genitourinary: negative  Musculoskeletal: negative  Neurologic: negative  Psychiatric: negative  Hematologic/Lymphatic/Immunologic: negative  Endocrine: negative      O:   NAD, WDWN, Alert & Oriented, Mood & Affect wnl, Vitals stable   Here today alone   /69   Pulse 90   SpO2 99%    General appearance normal   Vitals  stable   Alert, oriented and in no acute distress     Sweating in axilla      Eyes: Conjunctivae/lids:Normal     ENT: Lips, buccal mucosa, tongue: normal    MSK:Normal    Cardiovascular: peripheral edema none    Pulm: Breathing Normal    Neuro/Psych: Orientation:Alert and Orientedx3 ; Mood/Affect:normal       A/P:  1. Axillary hyperhidrosis  BOTOX:  PGACAC discussed.  Risks including but not limited to injection site reaction, bruising, asymmetry, no resolution of sweating, dry mouth, tiredness, and headache.  Also label warnings are difficulty with swallowing, speaking, breathing, muscle weakness, loss of bladder control, and double vision/blurred vision.  Explained confirmed report of spread of toxin effect when used for hyperhidrosis of underarms or cosmetic use on face has not been reported.  All questions answered and entertained to patient s satisfaction.  Informed consent obtained.      Botox-A in concentration of 2.5u/0.1cc was injected ID to R and L axilla 50 units each, 100 units total.  Patient tolerated without complications and given wound care instructions, including not to move product around.  Return in 2 weeks for follow-up and possible additional botox if needed.    [id number] 8/2021  [id number] 3/2022      Again, thank you for allowing me to participate in the care of your patient.        Sincerely,        Wyatt Washington MD

## 2020-02-19 NOTE — NURSING NOTE
Chief Complaint   Patient presents with     Botox     armpits       Vitals:    02/19/20 1251   BP: 120/69   Pulse: 90   SpO2: 99%     Wt Readings from Last 1 Encounters:   No data found for Wt       Loren Gary LPN.................2/19/2020

## 2020-03-10 ENCOUNTER — HEALTH MAINTENANCE LETTER (OUTPATIENT)
Age: 25
End: 2020-03-10

## 2020-03-10 DIAGNOSIS — Z79.899 ENCOUNTER FOR LONG-TERM (CURRENT) USE OF HIGH-RISK MEDICATION: ICD-10-CM

## 2020-03-10 DIAGNOSIS — L74.510 AXILLARY HYPERHIDROSIS: ICD-10-CM

## 2020-03-10 DIAGNOSIS — L73.2 HIDRADENITIS SUPPURATIVA: ICD-10-CM

## 2020-03-10 NOTE — TELEPHONE ENCOUNTER
Requested Prescriptions   Pending Prescriptions Disp Refills     adalimumab (HUMIRA *CF*) 80 MG/0.8ML & 40MG/0.4ML pen kit 1 each 1     Sig: Inject 40 mg subcutaneous once weekly.       There is no refill protocol information for this order        Last office visit: 2/19/2020 with prescribing provider:  Dr. Washington   Future Office Visit:        Denise Behrendt  Specialty CSS

## 2020-06-26 ENCOUNTER — TRANSFERRED RECORDS (OUTPATIENT)
Dept: HEALTH INFORMATION MANAGEMENT | Facility: CLINIC | Age: 25
End: 2020-06-26

## 2020-06-29 ENCOUNTER — VIRTUAL VISIT (OUTPATIENT)
Dept: DERMATOLOGY | Facility: CLINIC | Age: 25
End: 2020-06-29
Payer: COMMERCIAL

## 2020-06-29 DIAGNOSIS — L73.2 HIDRADENITIS SUPPURATIVA: Primary | ICD-10-CM

## 2020-06-29 DIAGNOSIS — Z79.899 ENCOUNTER FOR LONG-TERM (CURRENT) USE OF HIGH-RISK MEDICATION: ICD-10-CM

## 2020-06-29 PROCEDURE — 99213 OFFICE O/P EST LOW 20 MIN: CPT | Mod: TEL | Performed by: DERMATOLOGY

## 2020-06-29 NOTE — PROGRESS NOTES
"Sugar Bonilla is a 24 year old female who is being evaluated via a billable telephone visit.      The patient has been notified of following:     \"This telephone visit will be conducted via a call between you and your physician/provider. We have found that certain health care needs can be provided without the need for a physical exam.  This service lets us provide the care you need with a short phone conversation.  If a prescription is necessary we can send it directly to your pharmacy.  If lab work is needed we can place an order for that and you can then stop by our lab to have the test done at a later time.    Telephone visits are billed at different rates depending on your insurance coverage. During this emergency period, for some insurers they may be billed the same as an in-person visit.  Please reach out to your insurance provider with any questions.    If during the course of the call the physician/provider feels a telephone visit is not appropriate, you will not be charged for this service.\"    Patient has given verbal consent for Telephone visit?  Yes    What phone number would you like to be contacted at? Home     How would you like to obtain your AVS? MyChart    Phone call duration: 5 minutes    Wyatt Washington MD    Sugar Bonilla is a 24 year old year old female patient here today for f/u hidradenitis was clear but getting muscle pain and joint pain.  Thyroid and CBC and cmp pending.  NO rash.  Currently on bactrim for a sore.   Patient states this has been present for weeks.  Patient reports the following symptoms:  Joint pain.  Patient has no other skin complaints today.  Remainder of the HPI, Meds, PMH, Allergies, FH, and SH was reviewed in chart.    No past medical history on file.    No past surgical history on file.     No family history on file.    Social History     Socioeconomic History     Marital status: Single     Spouse name: Not on file     Number of children: Not on file "     Years of education: Not on file     Highest education level: Not on file   Occupational History     Not on file   Social Needs     Financial resource strain: Not on file     Food insecurity     Worry: Not on file     Inability: Not on file     Transportation needs     Medical: Not on file     Non-medical: Not on file   Tobacco Use     Smoking status: Current Every Day Smoker     Packs/day: 0.50     Types: Cigarettes     Smokeless tobacco: Never Used   Substance and Sexual Activity     Alcohol use: Not on file     Drug use: Not on file     Sexual activity: Not on file   Lifestyle     Physical activity     Days per week: Not on file     Minutes per session: Not on file     Stress: Not on file   Relationships     Social connections     Talks on phone: Not on file     Gets together: Not on file     Attends [employer] service: Not on file     Active member of club or organization: Not on file     Attends meetings of clubs or organizations: Not on file     Relationship status: Not on file     Intimate partner violence     Fear of current or ex partner: Not on file     Emotionally abused: Not on file     Physically abused: Not on file     Forced sexual activity: Not on file   Other Topics Concern     Not on file   Social History Narrative     Not on file       Outpatient Encounter Medications as of 6/29/2020   Medication Sig Dispense Refill     adalimumab (HUMIRA *CF*) 80 MG/0.8ML & 40MG/0.4ML pen kit Inject 40 mg subcutaneous once weekly. 1 each 3     adalimumab (HUMIRA) 40 MG/0.8ML prefilled syringe kit 40 mg injections on Day 1, 80 mg two weeks later (Day 15). Begin 40 mg weekly dosing two weeks later (Day 29). 12 Syringe 3     aluminum chloride (DRYSOL) 20 % external solution Apply topically At Bedtime 60 mL 3     chlorhexidine (HIBICLENS) 4 % liquid Apply topically daily as needed for wound care Wash axilla, groin daily DO NOT PUT ON FACE (Patient not taking: Reported on 1/22/2020) 946 mL 11     clindamycin-benzoyl  peroxide (BENZACLIN) 1-5 % external gel Apply topically 2 times daily To axilla and groin (Patient not taking: Reported on 1/22/2020) 50 g 11     doxycycline monohydrate (MONODOX) 100 MG capsule Take 1 capsule (100 mg) by mouth 2 times daily (Patient not taking: Reported on 1/22/2020) 60 capsule 3     FLUoxetine (PROZAC) 20 MG capsule Take 20 mg by mouth       SUMAtriptan (IMITREX) 50 MG tablet Take 50 mg by mouth       traZODone (DESYREL) 100 MG tablet Take 100 mg by mouth       No facility-administered encounter medications on file as of 6/29/2020.              Review Of Systems  Skin: As above  Eyes: negative  Ears/Nose/Throat: negative  Respiratory: No shortness of breath, dyspnea on exertion, cough, or hemoptysis  Cardiovascular: negative  Gastrointestinal: negative  Genitourinary: negative  Musculoskeletal: negative  Neurologic: negative  Psychiatric: negative  Hematologic/Lymphatic/Immunologic: negative  Endocrine: negative      O:   Alert & Orientedx3, Mood & Affect wnl,    General appearance normal   Alert, oriented and in no acute distress    Skin clear    Pulm: Breathing Normal, talking in normal sentences, no shortness of breath during conversation    Neuro/Psych: Orientation:Alert and Orientedx3 ; Mood/Affect:normal ; no anxiety or depression       A/P:  1. Hidradenitis   Stop humira  Check sofía, rudolph, ds dna, histone  Cont bactrim  Topicals discussed with patient   F/u pending blood work

## 2020-06-29 NOTE — LETTER
"    6/29/2020         RE: Sugar Bonilla  [address]  [place] [address]        Dear Colleague,    Thank you for referring your patient, Sugar Bonilla, to the [facility]. Please see a copy of my visit note below.    Sugar Bonilla is a 24 year old female who is being evaluated via a billable telephone visit.      The patient has been notified of following:     \"This telephone visit will be conducted via a call between you and your physician/provider. We have found that certain health care needs can be provided without the need for a physical exam.  This service lets us provide the care you need with a short phone conversation.  If a prescription is necessary we can send it directly to your pharmacy.  If lab work is needed we can place an order for that and you can then stop by our lab to have the test done at a later time.    Telephone visits are billed at different rates depending on your insurance coverage. During this emergency period, for some insurers they may be billed the same as an in-person visit.  Please reach out to your insurance provider with any questions.    If during the course of the call the physician/provider feels a telephone visit is not appropriate, you will not be charged for this service.\"    Patient has given verbal consent for Telephone visit?  Yes    What phone number would you like to be contacted at? Home     How would you like to obtain your AVS? [facility]    Phone call duration: 5 minutes    Wyatt Washington MD    Sugar Bonilla is a 24 year old year old female patient here today for f/u hidradenitis was clear but getting muscle pain and joint pain.  Thyroid and CBC and cmp pending.  NO rash.  Currently on bactrim for a sore.   Patient states this has been present for weeks.  Patient reports the following symptoms:  Joint pain.  Patient has no other skin complaints today.  Remainder of the HPI, Meds, PMH, Allergies, FH, and SH was reviewed " in chart.    No past medical history on file.    No past surgical history on file.     No family history on file.    Social History     Socioeconomic History     Marital status: Single     Spouse name: Not on file     Number of children: Not on file     Years of education: Not on file     Highest education level: Not on file   Occupational History     Not on file   Social Needs     Financial resource strain: Not on file     Food insecurity     Worry: Not on file     Inability: Not on file     Transportation needs     Medical: Not on file     Non-medical: Not on file   Tobacco Use     Smoking status: Current Every Day Smoker     Packs/day: 0.50     Types: Cigarettes     Smokeless tobacco: Never Used   Substance and Sexual Activity     Alcohol use: Not on file     Drug use: Not on file     Sexual activity: Not on file   Lifestyle     Physical activity     Days per week: Not on file     Minutes per session: Not on file     Stress: Not on file   Relationships     Social connections     Talks on phone: Not on file     Gets together: Not on file     Attends Restoration[employer] service: Not on file     Active member of club or organization: Not on file     Attends meetings of clubs or organizations: Not on file     Relationship status: Not on file     Intimate partner violence     Fear of current or ex partner: Not on file     Emotionally abused: Not on file     Physically abused: Not on file     Forced sexual activity: Not on file   Other Topics Concern     Not on file   Social History Narrative     Not on file       Outpatient Encounter Medications as of 6/29/2020   Medication Sig Dispense Refill     adalimumab (HUMIRA *CF*) 80 MG/0.8ML & 40MG/0.4ML pen kit Inject 40 mg subcutaneous once weekly. 1 each 3     adalimumab (HUMIRA) 40 MG/0.8ML prefilled syringe kit 40 mg injections on Day 1, 80 mg two weeks later (Day 15). Begin 40 mg weekly dosing two weeks later (Day 29). 12 Syringe 3     aluminum chloride (DRYSOL) 20 % external  solution Apply topically At Bedtime 60 mL 3     chlorhexidine (HIBICLENS) 4 % liquid Apply topically daily as needed for wound care Wash axilla, groin daily DO NOT PUT ON FACE (Patient not taking: Reported on 1/22/2020) 946 mL 11     clindamycin-benzoyl peroxide (BENZACLIN) 1-5 % external gel Apply topically 2 times daily To axilla and groin (Patient not taking: Reported on 1/22/2020) 50 g 11     doxycycline monohydrate (MONODOX) 100 MG capsule Take 1 capsule (100 mg) by mouth 2 times daily (Patient not taking: Reported on 1/22/2020) 60 capsule 3     FLUoxetine (PROZAC) 20 MG capsule Take 20 mg by mouth       SUMAtriptan (IMITREX) 50 MG tablet Take 50 mg by mouth       traZODone (DESYREL) 100 MG tablet Take 100 mg by mouth       No facility-administered encounter medications on file as of 6/29/2020.              Review Of Systems  Skin: As above  Eyes: negative  Ears/Nose/Throat: negative  Respiratory: No shortness of breath, dyspnea on exertion, cough, or hemoptysis  Cardiovascular: negative  Gastrointestinal: negative  Genitourinary: negative  Musculoskeletal: negative  Neurologic: negative  Psychiatric: negative  Hematologic/Lymphatic/Immunologic: negative  Endocrine: negative      O:   Alert & Orientedx3, Mood & Affect wnl,    General appearance normal   Alert, oriented and in no acute distress    Skin clear    Pulm: Breathing Normal, talking in normal sentences, no shortness of breath during conversation    Neuro/Psych: Orientation:Alert and Orientedx3 ; Mood/Affect:normal ; no anxiety or depression       A/P:  1. Hidradenitis   Stop humira  Check sofía, rudolph, ds dna, histone  Cont bactrim  Topicals discussed with patient   F/u pending blood work       Again, thank you for allowing me to participate in the care of your patient.        Sincerely,        Wyatt Washington MD

## 2020-07-01 ENCOUNTER — TELEPHONE (OUTPATIENT)
Dept: DERMATOLOGY | Facility: CLINIC | Age: 25
End: 2020-07-01

## 2020-07-01 DIAGNOSIS — L73.2 HIDRADENITIS SUPPURATIVA: ICD-10-CM

## 2020-07-01 DIAGNOSIS — Z79.899 ENCOUNTER FOR LONG-TERM (CURRENT) USE OF HIGH-RISK MEDICATION: ICD-10-CM

## 2020-07-01 LAB
ALBUMIN SERPL-MCNC: 3.9 G/DL (ref 3.4–5)
ALP SERPL-CCNC: 68 U/L (ref 40–150)
ALT SERPL W P-5'-P-CCNC: 36 U/L (ref 0–50)
ANION GAP SERPL CALCULATED.3IONS-SCNC: 6 MMOL/L (ref 3–14)
AST SERPL W P-5'-P-CCNC: 31 U/L (ref 0–45)
BILIRUB SERPL-MCNC: 0.5 MG/DL (ref 0.2–1.3)
BUN SERPL-MCNC: 12 MG/DL (ref 7–30)
CALCIUM SERPL-MCNC: 9.1 MG/DL (ref 8.5–10.1)
CHLORIDE SERPL-SCNC: 104 MMOL/L (ref 94–109)
CO2 SERPL-SCNC: 23 MMOL/L (ref 20–32)
CREAT SERPL-MCNC: 1.1 MG/DL (ref 0.52–1.04)
GFR SERPL CREATININE-BSD FRML MDRD: 69 ML/MIN/{1.73_M2}
GLUCOSE SERPL-MCNC: 115 MG/DL (ref 70–99)
POTASSIUM SERPL-SCNC: 3.7 MMOL/L (ref 3.4–5.3)
PROT SERPL-MCNC: 8.1 G/DL (ref 6.8–8.8)
SODIUM SERPL-SCNC: 133 MMOL/L (ref 133–144)

## 2020-07-01 PROCEDURE — 36415 COLL VENOUS BLD VENIPUNCTURE: CPT | Performed by: DERMATOLOGY

## 2020-07-01 PROCEDURE — 99000 SPECIMEN HANDLING OFFICE-LAB: CPT | Performed by: DERMATOLOGY

## 2020-07-01 PROCEDURE — 80053 COMPREHEN METABOLIC PANEL: CPT | Performed by: DERMATOLOGY

## 2020-07-01 PROCEDURE — 86038 ANTINUCLEAR ANTIBODIES: CPT | Performed by: DERMATOLOGY

## 2020-07-01 PROCEDURE — 83516 IMMUNOASSAY NONANTIBODY: CPT | Mod: 90 | Performed by: DERMATOLOGY

## 2020-07-01 PROCEDURE — 86225 DNA ANTIBODY NATIVE: CPT | Performed by: DERMATOLOGY

## 2020-07-01 PROCEDURE — 86039 ANTINUCLEAR ANTIBODIES (ANA): CPT | Performed by: DERMATOLOGY

## 2020-07-01 NOTE — TELEPHONE ENCOUNTER
Reason for Call:  Other call back    Detailed comments: pt calling stating she had a CBS and  TSH drawn at [place] in [facility]. Does Dr. Washington want these results?    Phone Number Patient can be reached at: Home number on file [phone] (home)    Best Time: any     Can we leave a detailed message on this number? YES    Call taken on 7/1/2020 at 10:47 AM by aMria Guadalupe Salgado

## 2020-07-02 LAB
ANA PAT SER IF-IMP: ABNORMAL
ANA SER QL IF: POSITIVE
ANA TITR SER IF: ABNORMAL {TITER}
DSDNA AB SER-ACNC: 4 IU/ML

## 2020-07-02 NOTE — TELEPHONE ENCOUNTER
Labs pending that were ordered by Padmini. Called [facility] in [place], will fax labs resulted there. Patient informed. Will keep patient updated on labs as they result via Linda OSMAN RN   Specialty Clinics

## 2020-07-04 LAB — HISTONE IGG SER IA-ACNC: 2.1 UNITS (ref 0–0.9)

## 2020-07-06 ENCOUNTER — TELEPHONE (OUTPATIENT)
Dept: DERMATOLOGY | Facility: CLINIC | Age: 25
End: 2020-07-06

## 2020-07-06 NOTE — TELEPHONE ENCOUNTER
Spoke to patient and advised of Dr. Washington's note and she verbalized understanding. Fariba Fisher RN

## 2020-07-06 NOTE — TELEPHONE ENCOUNTER
"Reason for call:  Patient reporting a symptom    Symptom or request: Pt calling - has had a couple positive test results in her mychart and \"I still haven't received a call so I am kind of freaking out\"    Duration (how long have symptoms been present):     Have you been treated for this before? No    Additional comments:     Phone Number patient can be reached at:  Home number on file [phone] (home)    Best Time:      Can we leave a detailed message on this number:  YES    Call taken on 7/6/2020 at 1:40 PM by Emilee Augustine    "

## 2020-07-08 ENCOUNTER — TELEPHONE (OUTPATIENT)
Dept: RHEUMATOLOGY | Facility: CLINIC | Age: 25
End: 2020-07-08

## 2020-07-08 ENCOUNTER — MYC MEDICAL ADVICE (OUTPATIENT)
Dept: DERMATOLOGY | Facility: CLINIC | Age: 25
End: 2020-07-08

## 2020-07-08 DIAGNOSIS — L93.2 DRUG-INDUCED LUPUS ERYTHEMATOSUS: Primary | ICD-10-CM

## 2020-07-08 DIAGNOSIS — T50.905A DRUG-INDUCED LUPUS ERYTHEMATOSUS: Primary | ICD-10-CM

## 2020-07-08 NOTE — LETTER
[facility]  [address], [address]                        [place] [address]  Ph: [phone]                     Fax: [phone]     Date: July 24, 2020  Name: Sugar Bonilla  YOB: 1995  MRN: [id number]     Dear Referring Provider,  Thank you for the referral for Sugar Bonilla, 1995. After careful review by the Rheumatologist, your patient does not meet the criteria for an appointment. We encourage you to refer your patient to one of our community sites:    [facility]    Provider of your choice  Thank you for your support and understanding.    Sincerely,  The Division of Arthritis and Autoimmune Disorders

## 2020-07-08 NOTE — TELEPHONE ENCOUNTER
[facility]    Phone Message    May a detailed message be left on voicemail: yes     Reason for Call: Other: The pt has been referred to Rheum. She states she was on Humira and it may have given her Lupus. Please call to start intake process. The pt states all of the med recs are in EPIC. Thanks.     Action Taken: Message routed to:  [facility]    Travel Screening: Not Applicable

## 2020-07-24 NOTE — TELEPHONE ENCOUNTER
Patient is referred for Hidradenitis suppurativa and Encounter for long-term (current) use of high-risk medication by Wyatt Washington.     This is not a diagnosis that the [facility] evaluates/manages/treats per protocol. Letter sent to referring.    Xiomara Ramirez CMA   7/24/2020 1:48 PM

## 2020-07-27 NOTE — TELEPHONE ENCOUNTER
See note below. I cued new referral and pulled up what I think is needed diagnosis- please check and sign new referral if correct diagnosis.     (I didn't see any diagnosis in Epic of suspect drug induced lupus..)    Fariba Fisher RN

## 2020-07-27 NOTE — TELEPHONE ENCOUNTER
Pt needs a new referral for rheumatology stating it is for drug-induced lupus (due to being on Humira)    Pt is requesting to be contacted @:  [phone] (ok to leave message)    Denise Behrendt  Specialty CSS

## 2020-07-27 NOTE — TELEPHONE ENCOUNTER
[facility] Call [facility]    Phone Message    May a detailed message be left on voicemail: yes     Reason for Call: Other: Patient called in check on her referral and i advised her we do not see for HS. The patient stated the referral was wrong then she was getting referred for drug induced Lupus. She is speaking with the referring provider tomorrow and will have him fix the referral. Please follow up with the patient to discuss. Thank you.     Action Taken: Message routed to:  [facility] rheum    Travel Screening: Not Applicable

## 2020-07-28 ENCOUNTER — VIRTUAL VISIT (OUTPATIENT)
Dept: DERMATOLOGY | Facility: CLINIC | Age: 25
End: 2020-07-28
Payer: COMMERCIAL

## 2020-07-28 DIAGNOSIS — T50.905A DRUG-INDUCED LUPUS ERYTHEMATOSUS: Primary | ICD-10-CM

## 2020-07-28 DIAGNOSIS — L93.2 DRUG-INDUCED LUPUS ERYTHEMATOSUS: Primary | ICD-10-CM

## 2020-07-28 DIAGNOSIS — L74.510 AXILLARY HYPERHIDROSIS: ICD-10-CM

## 2020-07-28 PROCEDURE — 99212 OFFICE O/P EST SF 10 MIN: CPT | Mod: TEL | Performed by: DERMATOLOGY

## 2020-07-28 RX ORDER — DOXYCYCLINE 100 MG/1
100 CAPSULE ORAL 2 TIMES DAILY
Qty: 60 CAPSULE | Refills: 3 | Status: SHIPPED | OUTPATIENT
Start: 2020-07-28 | End: 2021-02-15

## 2020-07-28 RX ORDER — CLINDAMYCIN AND BENZOYL PEROXIDE 10; 50 MG/G; MG/G
GEL TOPICAL 2 TIMES DAILY
Qty: 50 G | Refills: 6 | Status: SHIPPED | OUTPATIENT
Start: 2020-07-28 | End: 2022-10-03

## 2020-07-28 NOTE — LETTER
"    7/28/2020         RE: Sugar Bonilla  [address]  [place] [address]        Dear Colleague,    Thank you for referring your patient, Sugar Bonilla, to the [facility]. Please see a copy of my visit note below.    Sugar Bonilla is a 24 year old female who is being evaluated via a billable telephone visit.      The patient has been notified of following:     \"This telephone visit will be conducted via a call between you and your physician/provider. We have found that certain health care needs can be provided without the need for a physical exam.  This service lets us provide the care you need with a short phone conversation.  If a prescription is necessary we can send it directly to your pharmacy.  If lab work is needed we can place an order for that and you can then stop by our lab to have the test done at a later time.    Telephone visits are billed at different rates depending on your insurance coverage. During this emergency period, for some insurers they may be billed the same as an in-person visit.  Please reach out to your insurance provider with any questions.    If during the course of the call the physician/provider feels a telephone visit is not appropriate, you will not be charged for this service.\"    Patient has given verbal consent for Telephone visit?  Yes    What phone number would you like to be contacted at? home    How would you like to obtain your AVS? [facility]    Phone call duration: 5 minutes    Wyatt Washington MD        Sugar Bonilla is a 24 year old year old female patient here today for f/u hidradenitis was on humira and got new rash./  We stopped and she had a positive histone Ab.  She has not seen rheum yet because they thought she was being referred for hidradenitis.  HS is acting up a bit.  Patient has no other skin complaints today.  Remainder of the HPI, Meds, PMH, Allergies, FH, and SH was reviewed in chart.    No past medical " history on file.    No past surgical history on file.     No family history on file.    Social History     Socioeconomic History     Marital status: Single     Spouse name: Not on file     Number of children: Not on file     Years of education: Not on file     Highest education level: Not on file   Occupational History     Not on file   Social Needs     Financial resource strain: Not on file     Food insecurity     Worry: Not on file     Inability: Not on file     Transportation needs     Medical: Not on file     Non-medical: Not on file   Tobacco Use     Smoking status: Current Every Day Smoker     Packs/day: 0.50     Types: Cigarettes     Smokeless tobacco: Never Used   Substance and Sexual Activity     Alcohol use: Not on file     Drug use: Not on file     Sexual activity: Not on file   Lifestyle     Physical activity     Days per week: Not on file     Minutes per session: Not on file     Stress: Not on file   Relationships     Social connections     Talks on phone: Not on file     Gets together: Not on file     Attends [employer] service: Not on file     Active member of club or organization: Not on file     Attends meetings of clubs or organizations: Not on file     Relationship status: Not on file     Intimate partner violence     Fear of current or ex partner: Not on file     Emotionally abused: Not on file     Physically abused: Not on file     Forced sexual activity: Not on file   Other Topics Concern     Not on file   Social History Narrative     Not on file       Outpatient Encounter Medications as of 7/28/2020   Medication Sig Dispense Refill     adalimumab (HUMIRA *CF*) 80 MG/0.8ML & 40MG/0.4ML pen kit Inject 40 mg subcutaneous once weekly. 1 each 3     adalimumab (HUMIRA) 40 MG/0.8ML prefilled syringe kit 40 mg injections on Day 1, 80 mg two weeks later (Day 15). Begin 40 mg weekly dosing two weeks later (Day 29). 12 Syringe 3     aluminum chloride (DRYSOL) 20 % external solution Apply topically At  Bedtime 60 mL 3     chlorhexidine (HIBICLENS) 4 % liquid Apply topically daily as needed for wound care Wash axilla, groin daily DO NOT PUT ON FACE (Patient not taking: Reported on 1/22/2020) 946 mL 11     clindamycin-benzoyl peroxide (BENZACLIN) 1-5 % external gel Apply topically 2 times daily To axilla and groin (Patient not taking: Reported on 1/22/2020) 50 g 11     doxycycline monohydrate (MONODOX) 100 MG capsule Take 1 capsule (100 mg) by mouth 2 times daily (Patient not taking: Reported on 1/22/2020) 60 capsule 3     FLUoxetine (PROZAC) 20 MG capsule Take 20 mg by mouth       SUMAtriptan (IMITREX) 50 MG tablet Take 50 mg by mouth       traZODone (DESYREL) 100 MG tablet Take 100 mg by mouth       No facility-administered encounter medications on file as of 7/28/2020.              Review Of Systems  Skin: As above  Eyes: negative  Ears/Nose/Throat: negative  Respiratory: No shortness of breath, dyspnea on exertion, cough, or hemoptysis  Cardiovascular: negative  Gastrointestinal: negative  Genitourinary: negative  Musculoskeletal: negative  Neurologic: negative  Psychiatric: negative  Hematologic/Lymphatic/Immunologic: negative  Endocrine: negative      O:   Alert & Orientedx3, Mood & Affect wnl,    General appearance normal   Alert, oriented and in no acute distress     Inflammatory lesions in axilla      Pulm: Breathing Normal, talking in normal sentences, no shortness of breath during conversation    Neuro/Psych: Orientation:Alert and Orientedx3 ; Mood/Affect:normal ; no anxiety or depression       A/P:  1. Hidradenitis with likely drug induced lupus  Place another rheum   Restart hibiclens and doxy and benzaclin  F/u pending rhuem        Again, thank you for allowing me to participate in the care of your patient.        Sincerely,        Wyatt Washington MD

## 2020-07-28 NOTE — PROGRESS NOTES
"Sugar Bonilla is a 24 year old female who is being evaluated via a billable telephone visit.      The patient has been notified of following:     \"This telephone visit will be conducted via a call between you and your physician/provider. We have found that certain health care needs can be provided without the need for a physical exam.  This service lets us provide the care you need with a short phone conversation.  If a prescription is necessary we can send it directly to your pharmacy.  If lab work is needed we can place an order for that and you can then stop by our lab to have the test done at a later time.    Telephone visits are billed at different rates depending on your insurance coverage. During this emergency period, for some insurers they may be billed the same as an in-person visit.  Please reach out to your insurance provider with any questions.    If during the course of the call the physician/provider feels a telephone visit is not appropriate, you will not be charged for this service.\"    Patient has given verbal consent for Telephone visit?  Yes    What phone number would you like to be contacted at? home    How would you like to obtain your AVS? MyChart    Phone call duration: 5 minutes    Wyatt Washington MD        Sugar Bonilla is a 24 year old year old female patient here today for f/u hidradenitis was on humira and got new rash./  We stopped and she had a positive histone Ab.  She has not seen rheum yet because they thought she was being referred for hidradenitis.  HS is acting up a bit.  Patient has no other skin complaints today.  Remainder of the HPI, Meds, PMH, Allergies, FH, and SH was reviewed in chart.    No past medical history on file.    No past surgical history on file.     No family history on file.    Social History     Socioeconomic History     Marital status: Single     Spouse name: Not on file     Number of children: Not on file     Years of education: Not on " file     Highest education level: Not on file   Occupational History     Not on file   Social Needs     Financial resource strain: Not on file     Food insecurity     Worry: Not on file     Inability: Not on file     Transportation needs     Medical: Not on file     Non-medical: Not on file   Tobacco Use     Smoking status: Current Every Day Smoker     Packs/day: 0.50     Types: Cigarettes     Smokeless tobacco: Never Used   Substance and Sexual Activity     Alcohol use: Not on file     Drug use: Not on file     Sexual activity: Not on file   Lifestyle     Physical activity     Days per week: Not on file     Minutes per session: Not on file     Stress: Not on file   Relationships     Social connections     Talks on phone: Not on file     Gets together: Not on file     Attends Church service: Not on file     Active member of club or organization: Not on file     Attends meetings of clubs or organizations: Not on file     Relationship status: Not on file     Intimate partner violence     Fear of current or ex partner: Not on file     Emotionally abused: Not on file     Physically abused: Not on file     Forced sexual activity: Not on file   Other Topics Concern     Not on file   Social History Narrative     Not on file       Outpatient Encounter Medications as of 7/28/2020   Medication Sig Dispense Refill     adalimumab (HUMIRA *CF*) 80 MG/0.8ML & 40MG/0.4ML pen kit Inject 40 mg subcutaneous once weekly. 1 each 3     adalimumab (HUMIRA) 40 MG/0.8ML prefilled syringe kit 40 mg injections on Day 1, 80 mg two weeks later (Day 15). Begin 40 mg weekly dosing two weeks later (Day 29). 12 Syringe 3     aluminum chloride (DRYSOL) 20 % external solution Apply topically At Bedtime 60 mL 3     chlorhexidine (HIBICLENS) 4 % liquid Apply topically daily as needed for wound care Wash axilla, groin daily DO NOT PUT ON FACE (Patient not taking: Reported on 1/22/2020) 946 mL 11     clindamycin-benzoyl peroxide (BENZACLIN) 1-5 %  external gel Apply topically 2 times daily To axilla and groin (Patient not taking: Reported on 1/22/2020) 50 g 11     doxycycline monohydrate (MONODOX) 100 MG capsule Take 1 capsule (100 mg) by mouth 2 times daily (Patient not taking: Reported on 1/22/2020) 60 capsule 3     FLUoxetine (PROZAC) 20 MG capsule Take 20 mg by mouth       SUMAtriptan (IMITREX) 50 MG tablet Take 50 mg by mouth       traZODone (DESYREL) 100 MG tablet Take 100 mg by mouth       No facility-administered encounter medications on file as of 7/28/2020.              Review Of Systems  Skin: As above  Eyes: negative  Ears/Nose/Throat: negative  Respiratory: No shortness of breath, dyspnea on exertion, cough, or hemoptysis  Cardiovascular: negative  Gastrointestinal: negative  Genitourinary: negative  Musculoskeletal: negative  Neurologic: negative  Psychiatric: negative  Hematologic/Lymphatic/Immunologic: negative  Endocrine: negative      O:   Alert & Orientedx3, Mood & Affect wnl,    General appearance normal   Alert, oriented and in no acute distress     Inflammatory lesions in axilla      Pulm: Breathing Normal, talking in normal sentences, no shortness of breath during conversation    Neuro/Psych: Orientation:Alert and Orientedx3 ; Mood/Affect:normal ; no anxiety or depression       A/P:  1. Hidradenitis with likely drug induced lupus  Place another rheum   Restart hibiclens and doxy and benzaclin  F/u pending rhuem

## 2020-08-03 RX ORDER — FLUTICASONE PROPIONATE 50 MCG
2 SPRAY, SUSPENSION (ML) NASAL DAILY
COMMUNITY
End: 2022-10-03

## 2020-08-03 RX ORDER — OXYCODONE HYDROCHLORIDE 5 MG/1
5 CAPSULE ORAL EVERY 4 HOURS PRN
COMMUNITY
End: 2022-10-03

## 2020-08-03 RX ORDER — BACLOFEN 10 MG/1
10 TABLET ORAL DAILY
COMMUNITY
End: 2022-10-03

## 2020-08-03 RX ORDER — HYDROXYZINE HYDROCHLORIDE 25 MG/1
25 TABLET, FILM COATED ORAL DAILY
COMMUNITY
Start: 2020-07-14 | End: 2022-10-03

## 2020-08-03 RX ORDER — FLUOXETINE 40 MG/1
40 CAPSULE ORAL DAILY
COMMUNITY
Start: 2020-07-21 | End: 2022-10-03

## 2020-08-04 ENCOUNTER — VIRTUAL VISIT (OUTPATIENT)
Dept: RHEUMATOLOGY | Facility: CLINIC | Age: 25
End: 2020-08-04
Payer: COMMERCIAL

## 2020-08-04 DIAGNOSIS — L73.2 HIDRADENITIS SUPPURATIVA: ICD-10-CM

## 2020-08-04 DIAGNOSIS — F17.200 TOBACCO USE DISORDER: ICD-10-CM

## 2020-08-04 DIAGNOSIS — T50.905A DRUG-INDUCED LUPUS ERYTHEMATOSUS: ICD-10-CM

## 2020-08-04 DIAGNOSIS — L93.2 DRUG-INDUCED LUPUS ERYTHEMATOSUS: ICD-10-CM

## 2020-08-04 DIAGNOSIS — M06.4 INFLAMMATORY POLYARTHROPATHY (H): Primary | ICD-10-CM

## 2020-08-04 PROCEDURE — 99204 OFFICE O/P NEW MOD 45 MIN: CPT | Mod: GT | Performed by: INTERNAL MEDICINE

## 2020-08-04 RX ORDER — PREDNISONE 5 MG/1
TABLET ORAL
Qty: 104 TABLET | Refills: 0 | Status: SHIPPED | OUTPATIENT
Start: 2020-08-04 | End: 2022-10-03

## 2020-08-04 RX ORDER — HYDROXYCHLOROQUINE SULFATE 200 MG/1
200 TABLET, FILM COATED ORAL 2 TIMES DAILY
Qty: 60 TABLET | Refills: 3 | Status: SHIPPED | OUTPATIENT
Start: 2020-08-04 | End: 2020-12-02

## 2020-08-04 NOTE — Clinical Note
Dr. Washington,  I agree with DIL. I think this will improve since humira was stopped. Arthritis symptoms need tx given persistence though - I started hydroxychloroquine and prednisone.   Omar

## 2020-08-04 NOTE — PROGRESS NOTES
"Sugar Bonilla is a 24 year old female who is being evaluated via a billable video visit.      The patient has been notified of following:     \"This video visit will be conducted via a call between you and your physician/provider. We have found that certain health care needs can be provided without the need for an in-person physical exam.  This service lets us provide the care you need with a video conversation.  If a prescription is necessary we can send it directly to your pharmacy.  If lab work is needed we can place an order for that and you can then stop by our lab to have the test done at a later time.    Video visits are billed at different rates depending on your insurance coverage.  Please reach out to your insurance provider with any questions.    If during the course of the call the physician/provider feels a video visit is not appropriate, you will not be charged for this service.\"    Patient has given verbal consent for Video visit? Yes  How would you like to obtain your AVS? MyChart  If you are dropped from the video visit, the video invite should be resent to: Text to cell phone: [phone]  Will anyone else be joining your video visit? No    Rheumatology Video Visit      Sugar Bonilla MRN# [id number]   YOB: 1995 Age: 24 year old      Date of visit: 8/04/20   Referring provider: Dr. Wyatt Washington    Chief Complaint   Patient presents with:  Consult: Discuss drug-induced lupus, referred by Dr. Washington      Assessment and Plan     1. Inflammatory Polyarthropathy, Likely related to Drug Induced Lupus: Significant inflammatory arthritis symptoms at this time likely due to drug induced lupus, but cannot rule out pre-existing lupus or rheumatoid arthritis as the etiology.  FRANCINE and histone antibodies may be found in non-drug induced lupus; and they could be present simply due to Humira use (used for hidradenitis suppurativa).  Reasonable to stop Humira (already done in mid-June " per patient)  Use prednisone and start HCQ given significant inflammatory joint pains.  Check labs and x-rays as noted below.   - Start hydroxychloroquine 200mg BID  - Start prednisone 10mg daily for 2weeks, then 5mg daily thereafter.   - Labs: CBC, CMP, ESR, CRP, FRANCINE, MELLISSA, dsDNA, C3, C4, APS labs, RF, CCP, Lyme, histone, Hepatitis B, HIV, CK, UA, Uprotein:creatinine  - X-rays: bilateral hands/feet    # Pregnancy planning: sexually active and not on birth control    # Hydroxychloroquine (Plaquenil) Risks and Benefits:  The risks and benefits of hydroxychloroquine were discussed in detail and the patient verbalized understanding; the patient also verbalized agreement to get the required ophthalmologic toxicity monitoring.  The risks discussed include, but are not limited to, the risk for hypersensitivity, anaphylaxis, anaphylactoid reactions, irreversible retinal damage, rare hematologic reactions, and rare cardiomyopathy.  Patients with G6PD deficiency or hepatic impairment may be at an increased risk for adverse effects.  I encouraged reviewing the package insert and asking any questions about the medication.      # Prednisone Risks and Benefits: The risks and benefits of prednisone were discussed in detail and the patient verbalized understanding.  The risks discussed include, but are not limited to, weight gain, fluid retention, impaired wound healing, hyperglycemia, adrenal suppression, GI upset, peptic ulcer, hepatotoxicity, aseptic necrosis of the femoral and humeral heads, osteoporosis, myopathy, tendon rupture (particularly Achilles tendon), ocular changes including an increased intraocular pressure.  I encouraged reviewing the package insert and asking any questions about the medication.      2. Hidradenitis suppurativa: following with dermatology. Was doing well on Humira, but now having return of symptoms since stopping.  Has doxycycline and she is thinking about starting doxycycline but recalls some GI  upset with it in the past.  She plans to discuss with Dr. Washington.     3. Cigarette Smoking:  Encouraged complete cessation and discussed the health benefits.      # Relevant labs and imaging were reviewed with the patient    # High risk medication toxicity monitoring: discussion and labs reviewed; appropriate labs ordered. See above.  Instructed that if confirmed to have COVID-19 or exposure to someone with confirmed COVID-19 to call this clinic for directions on DMARD management.    # Note that this is a virtual visit to reduce the risk of COVID-19 exposure during this current pandemic.      # Considered to be at high risk of complications from the COVID-19 virus.  It is recommended to limit contact with other people and if possible to work remotely or provide a leave of absence to reduce the risk for COVID-19.      Ms. Bonilla verbalized agreement with and understanding of the rational for the diagnosis and treatment plan.  All questions were answered to best of my ability and the patient's satisfaction. Ms. Bonilla was advised to contact the clinic with any questions that may arise after the clinic visit.      Thank you for involving me in the care of the patient    Return to clinic: 3 months      HPI   Patrickradha SACHIN Bonilla is a 24 year old female with a past medical history significant for hidradenitis suppurativa who is seen in consultation at the request of Dr. Washington for evaluation of drug induced lupus.    Today, Ms. Bonilla reports that she was on Humira since Nov 2019 for hidradenitis suppurativa.  In June 2020 she was having joint pain and weakness.  Joint pain started after horseback riding in May; splinted the right wrist.  Then two days later swelling in the right knee.  Then the left wrist for a couple days.  Then since then with joint pain on and off including wrists most often, elbows, knees, shoulders. Trouble walking or dressing herself at one point. Off Humira since mid-June 2020.  Since  mid-June the joint aches have been daily, symmetric or asymmetric joint involvement each day. Morning stiffness most of the day; better with activity and worse with inactivity. Worse hidradenitis suppurativa symptoms.  Deep breath results in mild left pleuritic pain, on and off, worse since she sneezed 4 days ago; worse if laying on the L>R side. No shortness of breath.     Denies fevers, chills, nausea, vomiting, constipation, diarrhea. No abdominal pain. No chest pain/pressure, palpitations, or shortness of breath. No LE swelling. No neck pain. No oral or nasal sores.  No sicca symptoms. No photosensitivity or photophobia. No eye pain or redness. No history of inflammatory eye disease.  No history of inflammatory bowel disease. No history of DVT, pulmonary embolism, or miscarriage.   No history of serositis.  No history of Raynaud's Phenomenon.  No known seizure disorder.  No known renal disorder.      Doxycycline for hidradenitis suppurativa now; doesn't want to use it though because of associated GI upset in the past with it.  Hasn't started it    Not on birth control currently. Sexually active now. Trying for pregnancy.     +Fatigue.  Says that anxiety and depression has been an issue.  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     ROS   GEN: No fevers, chills, night sweats, fatigue, or weight change  SKIN: No itching, rashes, sores  HEENT: No epistaxis. No oral or nasal ulcers.  CV: No chest pain, pressure, palpitations, or dyspnea on exertion.  PULM: No SOB, wheeze, cough.  GI: No nausea, vomiting, constipation, diarrhea. No blood in stool. No abdominal pain.  : No blood in urine.  MSK: See HPI.  NEURO: No numbness, tingling, or weakness.  ENDO: No heat/cold intolerance.  EXT: No LE swelling  PSYCH:  Negative    Active Problem List   There is no problem list on file for this patient.    Past Medical History   No past medical history on file.  Past Surgical History   No past surgical history on file.  Allergy     Allergies   Allergen Reactions     Cefaclor Nausea     Current Medication List     Current Outpatient Medications   Medication Sig     aluminum chloride (DRYSOL) 20 % external solution Apply topically At Bedtime     baclofen (LIORESAL) 10 MG tablet Take 10 mg by mouth daily     FLUoxetine (PROZAC) 40 MG capsule Take 40 mg by mouth daily     fluticasone (FLONASE) 50 MCG/ACT nasal spray Spray 2 sprays into both nostrils daily     hydrOXYzine (ATARAX) 25 MG tablet Take 25 mg by mouth daily     omeprazole (PRILOSEC) 20 MG DR capsule Take 20 mg by mouth daily     oxyCODONE (OXY-IR) 5 MG capsule Take 5 mg by mouth every 4 hours as needed for severe pain     SUMAtriptan (IMITREX) 50 MG tablet Take 50 mg by mouth as needed      adalimumab (HUMIRA *CF*) 80 MG/0.8ML & 40MG/0.4ML pen kit Inject 40 mg subcutaneous once weekly. (Patient not taking: Reported on 7/28/2020)     adalimumab (HUMIRA) 40 MG/0.8ML prefilled syringe kit 40 mg injections on Day 1, 80 mg two weeks later (Day 15). Begin 40 mg weekly dosing two weeks later (Day 29). (Patient not taking: Reported on 7/28/2020)     chlorhexidine (HIBICLENS) 4 % liquid Wash axilla, groin daily do not put on face (Patient not taking: Reported on 8/3/2020)     chlorhexidine (HIBICLENS) 4 % liquid Apply topically daily as needed for wound care Wash axilla, groin daily DO NOT PUT ON FACE (Patient not taking: Reported on 1/22/2020)     clindamycin-benzoyl peroxide (BENZACLIN) 1-5 % external gel Apply topically 2 times daily (Patient not taking: Reported on 8/3/2020)     clindamycin-benzoyl peroxide (BENZACLIN) 1-5 % external gel Apply topically 2 times daily To axilla and groin (Patient not taking: Reported on 1/22/2020)     doxycycline monohydrate (MONODOX) 100 MG  capsule Take 1 capsule (100 mg) by mouth 2 times daily (Patient not taking: Reported on 8/3/2020)     doxycycline monohydrate (MONODOX) 100 MG capsule Take 1 capsule (100 mg) by mouth 2 times daily (Patient not taking: Reported on 1/22/2020)     FLUoxetine (PROZAC) 20 MG capsule Take 20 mg by mouth     traZODone (DESYREL) 100 MG tablet Take 100 mg by mouth     No current facility-administered medications for this visit.          Social History   See HPI    Family History   No family history on file.       Physical Exam     Temp Readings from Last 3 Encounters:   No data found for Temp     BP Readings from Last 5 Encounters:   02/19/20 120/69   01/22/20 112/70   11/13/19 112/67   08/13/19 108/73     Pulse Readings from Last 1 Encounters:   02/19/20 90     Resp Readings from Last 1 Encounters:   No data found for Resp     There is no height or weight on file to calculate BMI.      GEN: NAD. Healthy appearing adult.   HEENT: MMM.  Anicteric, noninjected sclera. No obvious external lesions of the ear and nose. Hearing intact.  PULM: No increased work of breathing; no use of accessory muscles.  MSK:  Mild swelling of the bilateral 2nd MCPs; other MCPs without swelling. PIPs, DIPs, wrists, elbows, knees, ankles, MTPs without swelling.   SKIN: No rash or jaundice seen. Axilla not seen on exam today.   PSYCH: Alert. Appropriate.        Labs / Imaging (select studies)     FRANCINE  Recent Labs   Lab Test 07/01/20 1708   GRAY Positive*   ANAP1 HOMOGENEOUS   ANAT1 1:1,280     dsDNA  Recent Labs   Lab Test 07/01/20 1708   DNA 4     Histone Antibody  Recent Labs   Lab Test 07/01/20 1708   HSTO 2.1*     CBC  Recent Labs   Lab Test 01/22/20  1355   WBC 11.6*   RBC 4.87   HGB 14.9   HCT 45.5   MCV 93   RDW 12.6      MCH 30.6   MCHC 32.7     CMP  Recent Labs   Lab Test 07/01/20  1708 01/22/20  1355    140   POTASSIUM 3.7 3.6   CHLORIDE 104 108   CO2 23 24   ANIONGAP 6 8   * 78   BUN 12 15   CR 1.10* 0.97    GFRESTIMATED 69 81   GFRESTBLACK 80 >90   BK 9.1 9.4   BILITOTAL 0.5 0.5   ALBUMIN 3.9 3.9   PROTTOTAL 8.1 7.7   ALKPHOS 68 73   AST 31 17   ALT 36 33     Calcium/VitaminD  Recent Labs   Lab Test 07/01/20  1708 01/22/20  1355   BK 9.1 9.4     Hepatitis C  Recent Labs   Lab Test 11/13/19  1139   HCVAB Nonreactive     Tuberculosis Screening  Recent Labs   Lab Test 11/13/19  1139   TBRES Negative     FRANCINE panel per patient from [place]  [facility] 0.2 (0.0-0.9)  SSA negative  SSB negative  RNP negative  Sm negative  Scl-70 negative  dsDNA negative  Farrah-1 negative  Centromere negative    Immunization History     There is no immunization history on file for this patient.       Chart documentation done in part with Dragon Voice recognition Software. Although reviewed after completion, some word and grammatical error may remain.      Video-Visit Details    Type of service:  Video Visit    Video Start Time: 10:00 AM  Video End Time: 10:45 AM    Originating Location (pt. Location): Home    Distant Location (provider location):  Home    Platform used for Video Visit: Trevon Alejo MD

## 2020-08-05 ENCOUNTER — ANCILLARY PROCEDURE (OUTPATIENT)
Dept: GENERAL RADIOLOGY | Facility: CLINIC | Age: 25
End: 2020-08-05
Attending: INTERNAL MEDICINE
Payer: COMMERCIAL

## 2020-08-05 DIAGNOSIS — M06.4 INFLAMMATORY POLYARTHROPATHY (H): ICD-10-CM

## 2020-08-05 DIAGNOSIS — L93.2 DRUG-INDUCED LUPUS ERYTHEMATOSUS: ICD-10-CM

## 2020-08-05 DIAGNOSIS — T50.905A DRUG-INDUCED LUPUS ERYTHEMATOSUS: ICD-10-CM

## 2020-08-05 LAB
ALBUMIN SERPL-MCNC: 4 G/DL (ref 3.4–5)
ALBUMIN UR-MCNC: NEGATIVE MG/DL
ALP SERPL-CCNC: 89 U/L (ref 40–150)
ALT SERPL W P-5'-P-CCNC: 68 U/L (ref 0–50)
ANION GAP SERPL CALCULATED.3IONS-SCNC: 5 MMOL/L (ref 3–14)
APPEARANCE UR: CLEAR
AST SERPL W P-5'-P-CCNC: 35 U/L (ref 0–45)
BASOPHILS # BLD AUTO: 0 10E9/L (ref 0–0.2)
BASOPHILS NFR BLD AUTO: 0.1 %
BILIRUB SERPL-MCNC: 0.5 MG/DL (ref 0.2–1.3)
BILIRUB UR QL STRIP: NEGATIVE
BUN SERPL-MCNC: 10 MG/DL (ref 7–30)
CALCIUM SERPL-MCNC: 8.9 MG/DL (ref 8.5–10.1)
CHLORIDE SERPL-SCNC: 103 MMOL/L (ref 94–109)
CK SERPL-CCNC: 93 U/L (ref 30–225)
CO2 SERPL-SCNC: 25 MMOL/L (ref 20–32)
COLOR UR AUTO: YELLOW
CREAT SERPL-MCNC: 0.92 MG/DL (ref 0.52–1.04)
CREAT UR-MCNC: 30 MG/DL
CRP SERPL-MCNC: 16.9 MG/L (ref 0–8)
DIFFERENTIAL METHOD BLD: NORMAL
EOSINOPHIL # BLD AUTO: 0.1 10E9/L (ref 0–0.7)
EOSINOPHIL NFR BLD AUTO: 1.5 %
ERYTHROCYTE [DISTWIDTH] IN BLOOD BY AUTOMATED COUNT: 12.2 % (ref 10–15)
ERYTHROCYTE [SEDIMENTATION RATE] IN BLOOD BY WESTERGREN METHOD: 16 MM/H (ref 0–20)
GFR SERPL CREATININE-BSD FRML MDRD: 86 ML/MIN/{1.73_M2}
GLUCOSE SERPL-MCNC: 116 MG/DL (ref 70–99)
GLUCOSE UR STRIP-MCNC: NEGATIVE MG/DL
HCT VFR BLD AUTO: 41.6 % (ref 35–47)
HGB BLD-MCNC: 14.3 G/DL (ref 11.7–15.7)
HGB UR QL STRIP: NEGATIVE
KETONES UR STRIP-MCNC: NEGATIVE MG/DL
LEUKOCYTE ESTERASE UR QL STRIP: NEGATIVE
LYMPHOCYTES # BLD AUTO: 1.8 10E9/L (ref 0.8–5.3)
LYMPHOCYTES NFR BLD AUTO: 24.1 %
MCH RBC QN AUTO: 30.2 PG (ref 26.5–33)
MCHC RBC AUTO-ENTMCNC: 34.4 G/DL (ref 31.5–36.5)
MCV RBC AUTO: 88 FL (ref 78–100)
MICROALBUMIN UR-MCNC: <5 MG/L
MICROALBUMIN/CREAT UR: NORMAL MG/G CR (ref 0–25)
MONOCYTES # BLD AUTO: 0.2 10E9/L (ref 0–1.3)
MONOCYTES NFR BLD AUTO: 3 %
NEUTROPHILS # BLD AUTO: 5.4 10E9/L (ref 1.6–8.3)
NEUTROPHILS NFR BLD AUTO: 71.3 %
NITRATE UR QL: NEGATIVE
PH UR STRIP: 7 PH (ref 5–7)
PLATELET # BLD AUTO: 406 10E9/L (ref 150–450)
POTASSIUM SERPL-SCNC: 3.6 MMOL/L (ref 3.4–5.3)
PROT SERPL-MCNC: 8.4 G/DL (ref 6.8–8.8)
PROT UR-MCNC: <0.05 G/L
PROT/CREAT 24H UR: NORMAL G/G CR (ref 0–0.2)
RBC # BLD AUTO: 4.73 10E12/L (ref 3.8–5.2)
SODIUM SERPL-SCNC: 133 MMOL/L (ref 133–144)
SOURCE: NORMAL
SP GR UR STRIP: 1.01 (ref 1–1.03)
UROBILINOGEN UR STRIP-ACNC: 0.2 EU/DL (ref 0.2–1)
WBC # BLD AUTO: 7.6 10E9/L (ref 4–11)

## 2020-08-05 PROCEDURE — 86225 DNA ANTIBODY NATIVE: CPT | Performed by: INTERNAL MEDICINE

## 2020-08-05 PROCEDURE — 86235 NUCLEAR ANTIGEN ANTIBODY: CPT | Performed by: INTERNAL MEDICINE

## 2020-08-05 PROCEDURE — 85732 THROMBOPLASTIN TIME PARTIAL: CPT | Performed by: INTERNAL MEDICINE

## 2020-08-05 PROCEDURE — 86618 LYME DISEASE ANTIBODY: CPT | Performed by: INTERNAL MEDICINE

## 2020-08-05 PROCEDURE — 86147 CARDIOLIPIN ANTIBODY EA IG: CPT | Performed by: INTERNAL MEDICINE

## 2020-08-05 PROCEDURE — 87389 HIV-1 AG W/HIV-1&-2 AB AG IA: CPT | Performed by: INTERNAL MEDICINE

## 2020-08-05 PROCEDURE — 36415 COLL VENOUS BLD VENIPUNCTURE: CPT | Performed by: INTERNAL MEDICINE

## 2020-08-05 PROCEDURE — 85730 THROMBOPLASTIN TIME PARTIAL: CPT | Performed by: INTERNAL MEDICINE

## 2020-08-05 PROCEDURE — 86200 CCP ANTIBODY: CPT | Performed by: INTERNAL MEDICINE

## 2020-08-05 PROCEDURE — 82043 UR ALBUMIN QUANTITATIVE: CPT | Performed by: INTERNAL MEDICINE

## 2020-08-05 PROCEDURE — 86704 HEP B CORE ANTIBODY TOTAL: CPT | Performed by: INTERNAL MEDICINE

## 2020-08-05 PROCEDURE — 81003 URINALYSIS AUTO W/O SCOPE: CPT | Performed by: INTERNAL MEDICINE

## 2020-08-05 PROCEDURE — 99000 SPECIMEN HANDLING OFFICE-LAB: CPT | Performed by: INTERNAL MEDICINE

## 2020-08-05 PROCEDURE — 86160 COMPLEMENT ANTIGEN: CPT | Performed by: INTERNAL MEDICINE

## 2020-08-05 PROCEDURE — 85652 RBC SED RATE AUTOMATED: CPT | Performed by: INTERNAL MEDICINE

## 2020-08-05 PROCEDURE — 73630 X-RAY EXAM OF FOOT: CPT | Mod: LT

## 2020-08-05 PROCEDURE — 00000401 ZZHCL STATISTIC THROMBIN TIME NC: Performed by: INTERNAL MEDICINE

## 2020-08-05 PROCEDURE — 73130 X-RAY EXAM OF HAND: CPT | Mod: RT

## 2020-08-05 PROCEDURE — 82550 ASSAY OF CK (CPK): CPT | Performed by: INTERNAL MEDICINE

## 2020-08-05 PROCEDURE — 85613 RUSSELL VIPER VENOM DILUTED: CPT | Performed by: INTERNAL MEDICINE

## 2020-08-05 PROCEDURE — 84156 ASSAY OF PROTEIN URINE: CPT | Performed by: INTERNAL MEDICINE

## 2020-08-05 PROCEDURE — 86146 BETA-2 GLYCOPROTEIN ANTIBODY: CPT | Performed by: INTERNAL MEDICINE

## 2020-08-05 PROCEDURE — 85025 COMPLETE CBC W/AUTO DIFF WBC: CPT | Performed by: INTERNAL MEDICINE

## 2020-08-05 PROCEDURE — 86140 C-REACTIVE PROTEIN: CPT | Performed by: INTERNAL MEDICINE

## 2020-08-05 PROCEDURE — 87340 HEPATITIS B SURFACE AG IA: CPT | Performed by: INTERNAL MEDICINE

## 2020-08-05 PROCEDURE — 86431 RHEUMATOID FACTOR QUANT: CPT | Performed by: INTERNAL MEDICINE

## 2020-08-05 PROCEDURE — 83516 IMMUNOASSAY NONANTIBODY: CPT | Mod: 90 | Performed by: INTERNAL MEDICINE

## 2020-08-05 PROCEDURE — 85597 PHOSPHOLIPID PLTLT NEUTRALIZ: CPT | Performed by: INTERNAL MEDICINE

## 2020-08-05 PROCEDURE — 86039 ANTINUCLEAR ANTIBODIES (ANA): CPT | Performed by: INTERNAL MEDICINE

## 2020-08-05 PROCEDURE — 80053 COMPREHEN METABOLIC PANEL: CPT | Performed by: INTERNAL MEDICINE

## 2020-08-05 PROCEDURE — 00000167 ZZHCL STATISTIC INR NC: Performed by: INTERNAL MEDICINE

## 2020-08-05 PROCEDURE — 86038 ANTINUCLEAR ANTIBODIES: CPT | Performed by: INTERNAL MEDICINE

## 2020-08-06 ENCOUNTER — MYC MEDICAL ADVICE (OUTPATIENT)
Dept: RHEUMATOLOGY | Facility: CLINIC | Age: 25
End: 2020-08-06

## 2020-08-06 LAB
B BURGDOR IGG+IGM SER QL: 0.37 (ref 0–0.89)
B2 GLYCOPROT1 IGG SERPL IA-ACNC: 1 U/ML
B2 GLYCOPROT1 IGM SERPL IA-ACNC: <2.9 U/ML
C3 SERPL-MCNC: 165 MG/DL (ref 81–157)
C4 SERPL-MCNC: 24 MG/DL (ref 13–39)
CARDIOLIPIN ANTIBODY IGG: <1.6 GPL-U/ML (ref 0–19.9)
CARDIOLIPIN ANTIBODY IGM: 0.8 MPL-U/ML (ref 0–19.9)
CCP AB SER IA-ACNC: 2 U/ML
DSDNA AB SER-ACNC: 3 IU/ML
ENA RNP IGG SER IA-ACNC: <0.2 AI (ref 0–0.9)
ENA SM IGG SER-ACNC: <0.2 AI (ref 0–0.9)
ENA SS-A IGG SER IA-ACNC: <0.2 AI (ref 0–0.9)
ENA SS-B IGG SER IA-ACNC: <0.2 AI (ref 0–0.9)
HBV CORE AB SERPL QL IA: NONREACTIVE
HBV SURFACE AG SERPL QL IA: NONREACTIVE
HIV 1+2 AB+HIV1 P24 AG SERPL QL IA: NONREACTIVE
RHEUMATOID FACT SER NEPH-ACNC: <7 IU/ML (ref 0–20)

## 2020-08-06 NOTE — TELEPHONE ENCOUNTER
Called the patient who reports that in the middle of the night she developed severe muscle pain and spasms. Then this morning she woke up at 5 AM with severe pain in her knees, back, and shoulders. Patient took some oxycodone which provided no relief. Patient stated she cannot sit or stand up. Rates her pain at a 9/10. Patient started crying over the phone due to discomfort. RN advised patient to call 911 for EMS services to be evaluated. Patient reported that her mom went to the store and will be back in 10 minutes and can drive her to the ER. Patient has no further questions.    Andres Washburn RN....8/6/2020 9:46 AM

## 2020-08-07 LAB
ANA PAT SER IF-IMP: ABNORMAL
ANA SER QL IF: POSITIVE
ANA TITR SER IF: ABNORMAL {TITER}
LA PPP-IMP: NEGATIVE

## 2020-08-08 LAB — HISTONE IGG SER IA-ACNC: 2.1 UNITS (ref 0–0.9)

## 2020-08-11 ENCOUNTER — VIRTUAL VISIT (OUTPATIENT)
Dept: RHEUMATOLOGY | Facility: CLINIC | Age: 25
End: 2020-08-11
Payer: COMMERCIAL

## 2020-08-11 DIAGNOSIS — T50.905A DRUG-INDUCED LUPUS ERYTHEMATOSUS: Primary | ICD-10-CM

## 2020-08-11 DIAGNOSIS — L93.2 DRUG-INDUCED LUPUS ERYTHEMATOSUS: Primary | ICD-10-CM

## 2020-08-11 DIAGNOSIS — M94.0 COSTOCHONDRITIS: ICD-10-CM

## 2020-08-11 PROCEDURE — 99213 OFFICE O/P EST LOW 20 MIN: CPT | Mod: GT | Performed by: INTERNAL MEDICINE

## 2020-08-11 RX ORDER — OXYCODONE AND ACETAMINOPHEN 10; 325 MG/1; MG/1
TABLET ORAL
COMMUNITY
Start: 2020-08-04 | End: 2022-10-03

## 2020-08-11 NOTE — PROGRESS NOTES
"Sugar Bonilla is a 24 year old female who is being evaluated via a billable video visit.      The patient has been notified of following:     \"This video visit will be conducted via a call between you and your physician/provider. We have found that certain health care needs can be provided without the need for an in-person physical exam.  This service lets us provide the care you need with a video conversation.  If a prescription is necessary we can send it directly to your pharmacy.  If lab work is needed we can place an order for that and you can then stop by our lab to have the test done at a later time.    Video visits are billed at different rates depending on your insurance coverage.  Please reach out to your insurance provider with any questions.    If during the course of the call the physician/provider feels a video visit is not appropriate, you will not be charged for this service.\"    Patient has given verbal consent for Video visit? Yes  How would you like to obtain your AVS? MyChart  If you are dropped from the video visit, the video invite should be resent to: Text to cell phone: [phone]    Rheumatology Video Visit      Sugar Bonilla MRN# [id number]   YOB: 1995 Age: 24 year old      Date of visit: 8/11/20   Referring provider: Dr. Wyatt Washington  PCP: Antonina Miranda at [facility]  Patient gives verbal consent to send my note via fax to her PCP    Chief Complaint   Patient presents with:  Arthritis: was in ED on 8/6, please see encounter      Assessment and Plan     1. 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  FRANCINE and histone antibodies may be found in non-drug induced lupus; and they could be present simply due to Humira use (used for hidradenitis suppurativa).  She is now off of Humira.  Currently on " "prednisone 5 mg daily and hydroxychloroquine 200 mg twice daily.  Following up sooner now because of left rib cage pain for 2 weeks that is worse if she pushes on that side; see #3.  - Continue hydroxychloroquine 200mg BID  - Continue prednisone 5mg daily thereafter.   - Labs in 3 mo: CBC, CMP, ESR, CRP, FRANCINE, MELLISSA, dsDNA, C3, C4, APS labs, RF, CCP, Lyme, histone, Hepatitis B, HIV, CK, UA, Uprotein:creatinine    # Pregnancy planning: sexually active and not on birth control    2. Hidradenitis suppurativa: following with dermatology. Was doing well on Humira, but now having return of symptoms since stopping.  Has doxycycline and she is thinking about starting doxycycline but recalls some GI upset with it in the past.  She plans to discuss with Dr. Washington.     3.  Left lower rib cage pain, likely costochondritis based on her description but she has also been evaluated in the emergency department where she was diagnosed with lower back pain as the etiology of these same symptoms: Has been seen in the emergency department for this and is planning to follow-up with her PCP.  Symptoms are most consistent with costochondritis as she has the pain intermittently and it is reproduced by her laying on the left side or pushing over the affected area on the left side    4.  Basilar lung findings seen on a CT abdomen pelvis on 8/6/2020 at [facility], with \"mild ill-defined ground-glass densities\": She reports that she already has a follow-up chest CT scheduled with her PCP to re-evaluate this    5. Cigarette Smoking:  Encouraged complete cessation and discussed the health benefits.      # Relevant labs and imaging were reviewed with the patient    # High risk medication toxicity monitoring: discussion and labs reviewed; appropriate labs ordered. See above.  Instructed that if confirmed to have COVID-19 or exposure to someone with confirmed COVID-19 to call this clinic for directions on DMARD management.    # Note that this is a " virtual visit to reduce the risk of COVID-19 exposure during this current pandemic.      # Considered to be at high risk of complications from the COVID-19 virus.  It is recommended to limit contact with other people and if possible to work remotely or provide a leave of absence to reduce the risk for COVID-19.      Ms. Bonilla verbalized agreement with and understanding of the rational for the diagnosis and treatment plan.  All questions were answered to best of my ability and the patient's satisfaction. Ms. Bonilla was advised to contact the clinic with any questions that may arise after the clinic visit.      Thank you for involving me in the care of the patient    Return to clinic: 3 months      HPI   Sugar Bonilla is a 24 year old female with a past medical history significant for hidradenitis suppurativa who is seen in consultation at the request of Dr. Washington for evaluation of drug induced lupus.    8/4/2020: Ms. Bonilla reported that she was on Humira since Nov 2019 for hidradenitis suppurativa.  In June 2020 she was having joint pain and weakness.  Joint pain started after horseback riding in May; splinted the right wrist.  Then two days later swelling in the right knee.  Then the left wrist for a couple days.  Then since then with joint pain on and off including wrists most often, elbows, knees, shoulders. Trouble walking or dressing herself at one point. Off Humira since mid-June 2020.  Since mid-June the joint aches have been daily, symmetric or asymmetric joint involvement each day. Morning stiffness most of the day; better with activity and worse with inactivity. Worse hidradenitis suppurativa symptoms.  Deep breath results in mild left pleuritic pain, on and off, worse since she sneezed 4 days ago; worse if laying on the L>R side. No shortness of breath.     Doxycycline for hidradenitis suppurativa now; doesn't want to use it though because of associated GI upset in the past with it.   Hasn't started it    Not on birth control currently. Sexually active now. Trying for pregnancy.     +Fatigue.  Says that anxiety and depression has been an issue.  tx'd with hydroxyzine that has helped sleep    Today, 8/11/2020: Presenting sooner to clinic because of left rib cage pain.  Reports going to the ED - pain with deep breath, sneeze; pain on the lower left rib cage, pain for 2 weeks; worse with laying on that side.  Pain is also with pushing on the left rib cage.  She notes that there were lung abnormality seen on a chest CT noticed by her PCP so she is going to have a repeat chest CT performed at the direction of her PCP to further evaluate.  She was given a higher dose of steroids in the emergency department, per patient, that has helped with the pain.  Emergency department note documents diagnosis of acute low back pain without sciatica.      Denies fevers, chills, nausea, vomiting, constipation, diarrhea. No abdominal pain. No chest pain/pressure, palpitations, or shortness of breath. No LE swelling. No neck pain. No oral or nasal sores.  No sicca symptoms. No photosensitivity or photophobia. No eye pain or redness. No history of inflammatory eye disease.  No history of inflammatory bowel disease. No history of DVT, pulmonary embolism, or miscarriage.   No history of serositis.  No history of Raynaud's Phenomenon.  No known seizure disorder.  No known renal disorder.      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     ROS   GEN: No fevers, chills, night sweats, or weight change  SKIN: No itching, rashes, sores  HEENT:No oral or nasal ulcers.  CV: No chest pain, pressure, palpitations, or dyspnea on exertion; but note the left rib cage pain noted in the HPI.  PULM: No SOB, wheeze, cough.  GI: No nausea, vomiting, constipation, diarrhea. No blood in stool.  No abdominal pain.  : No blood in urine.  MSK: See HPI.  NEURO: No numbness or tingling  ENDO: No heat/cold intolerance.  EXT: No LE swelling  PSYCH: Negative    Active Problem List   There is no problem list on file for this patient.    Past Medical History   No past medical history on file.  Past Surgical History   No past surgical history on file.  Allergy     Allergies   Allergen Reactions     Cefaclor Nausea     Current Medication List     Current Outpatient Medications   Medication Sig     aluminum chloride (DRYSOL) 20 % external solution Apply topically At Bedtime     baclofen (LIORESAL) 10 MG tablet Take 10 mg by mouth daily     FLUoxetine (PROZAC) 20 MG capsule Take 20 mg by mouth     FLUoxetine (PROZAC) 40 MG capsule Take 40 mg by mouth daily     hydroxychloroquine (PLAQUENIL) 200 MG tablet Take 1 tablet (200 mg) by mouth 2 times daily     hydrOXYzine (ATARAX) 25 MG tablet Take 25 mg by mouth daily     omeprazole (PRILOSEC) 20 MG DR capsule Take 20 mg by mouth daily     oxyCODONE-acetaminophen (PERCOCET)  MG per tablet      predniSONE (DELTASONE) 5 MG tablet Prednisone 10mg daily for 2weeks, then 5mg daily thereafter.     traZODone (DESYREL) 100 MG tablet Take 100 mg by mouth     adalimumab (HUMIRA *CF*) 80 MG/0.8ML & 40MG/0.4ML pen kit Inject 40 mg subcutaneous once weekly. (Patient not taking: Reported on 7/28/2020)     adalimumab (HUMIRA) 40 MG/0.8ML prefilled syringe kit 40 mg injections on Day 1, 80 mg two weeks later (Day 15). Begin 40 mg weekly dosing two weeks later (Day 29). (Patient not taking: Reported on 7/28/2020)     chlorhexidine (HIBICLENS) 4 % liquid Wash axilla, groin daily do not put on face (Patient not taking: Reported on 8/3/2020)     chlorhexidine (HIBICLENS) 4 % liquid Apply topically daily as needed for wound care Wash axilla, groin daily DO NOT PUT ON FACE (Patient not taking: Reported on 1/22/2020)     clindamycin-benzoyl peroxide (BENZACLIN) 1-5 % external gel Apply  topically 2 times daily (Patient not taking: Reported on 8/3/2020)     clindamycin-benzoyl peroxide (BENZACLIN) 1-5 % external gel Apply topically 2 times daily To axilla and groin (Patient not taking: Reported on 1/22/2020)     doxycycline monohydrate (MONODOX) 100 MG capsule Take 1 capsule (100 mg) by mouth 2 times daily (Patient not taking: Reported on 8/3/2020)     doxycycline monohydrate (MONODOX) 100 MG capsule Take 1 capsule (100 mg) by mouth 2 times daily (Patient not taking: Reported on 1/22/2020)     fluticasone (FLONASE) 50 MCG/ACT nasal spray Spray 2 sprays into both nostrils daily     oxyCODONE (OXY-IR) 5 MG capsule Take 5 mg by mouth every 4 hours as needed for severe pain     SUMAtriptan (IMITREX) 50 MG tablet Take 50 mg by mouth as needed      No current facility-administered medications for this visit.          Social History   See HPI    Family History   No family history on file.       Physical Exam     Temp Readings from Last 3 Encounters:   No data found for Temp     BP Readings from Last 5 Encounters:   02/19/20 120/69   01/22/20 112/70   11/13/19 112/67   08/13/19 108/73     Pulse Readings from Last 1 Encounters:   02/19/20 90     Resp Readings from Last 1 Encounters:   No data found for Resp     There is no height or weight on file to calculate BMI.      GEN: NAD. Healthy appearing adult.   HEENT: MMM.  Anicteric, noninjected sclera. No obvious external lesions of the ear and nose. Hearing intact.  PULM: No increased work of breathing; no use of accessory muscles.  MSK:  Mild swelling of the bilateral 2nd MCPs; other MCPs without swelling. PIPs, DIPs, wrists, elbows, knees, ankles, MTPs without swelling.   SKIN: No rash or jaundice seen. Axilla not seen on exam today.   PSYCH: Alert. Appropriate.        Labs / Imaging (select studies)   RF/CCP  Recent Labs   Lab Test 08/05/20  1134   CCPIGG 2   RHF <7     FRANCINE  Recent Labs   Lab Test 08/05/20  1134 07/01/20  1708   GRAY Positive* Positive*    ANAP1 HOMOGENEOUS HOMOGENEOUS   ANAT1 1:640 1:1,280     RNP/Sm/SSA/SSB  Recent Labs   Lab Test 08/05/20  1134   RNPIGG <0.2   SMIGG <0.2   SSAIGG <0.2   SSBIGG <0.2     dsDNA  Recent Labs   Lab Test 08/05/20 1134 07/01/20  1708   DNA 3 4     C3/C4  Recent Labs   Lab Test 08/05/20  1134   [id number] 165*   M6GFBMG 24     Histone Antibody  Recent Labs   Lab Test 08/05/20 1134 07/01/20  1708   HSTO 2.1* 2.1*     Antiphospholipid Antibodies  Recent Labs   Lab Test 08/05/20  1134   B2GPG 1.0   B2GPM <2.9   CARDG <1.6   CARDM 0.8   LUPINT Negative     CBC  Recent Labs   Lab Test 08/05/20 1134 01/22/20  1355   WBC 7.6 11.6*   RBC 4.73 4.87   HGB 14.3 14.9   HCT 41.6 45.5   MCV 88 93   RDW 12.2 12.6    313   MCH 30.2 30.6   MCHC 34.4 32.7   NEUTROPHIL 71.3  --    LYMPH 24.1  --    MONOCYTE 3.0  --    EOSINOPHIL 1.5  --    BASOPHIL 0.1  --    ANEU 5.4  --    ALYM 1.8  --    SKYLAR 0.2  --    AEOS 0.1  --    ABAS 0.0  --      CMP  Recent Labs   Lab Test 08/05/20 1134 07/01/20 1708 01/22/20  1355    133 140   POTASSIUM 3.6 3.7 3.6   CHLORIDE 103 104 108   CO2 25 23 24   ANIONGAP 5 6 8   * 115* 78   BUN 10 12 15   CR 0.92 1.10* 0.97   GFRESTIMATED 86 69 81   GFRESTBLACK >90 80 >90   BK 8.9 9.1 9.4   BILITOTAL 0.5 0.5 0.5   ALBUMIN 4.0 3.9 3.9   PROTTOTAL 8.4 8.1 7.7   ALKPHOS 89 68 73   AST 35 31 17   ALT 68* 36 33     Calcium/VitaminD  Recent Labs   Lab Test 08/05/20 1134 07/01/20 1708 01/22/20  1355   BK 8.9 9.1 9.4     ESR/CRP  Recent Labs   Lab Test 08/05/20  1134   SED 16   CRP 16.9*     CK/Aldolase  Recent Labs   Lab Test 08/05/20  1134   CKT 93       Hepatitis B  Recent Labs   Lab Test 08/05/20  1134   HBCAB Nonreactive   HEPBANG Nonreactive     Hepatitis C  Recent Labs   Lab Test 11/13/19  1139   HCVAB Nonreactive     Lyme ab screening  Recent Labs   Lab Test 08/05/20  1134   LYMEGM 0.37     Tuberculosis Screening  Recent Labs   Lab Test 11/13/19  1139   TBRES Negative     HIV Screening  Recent  Labs   Lab Test 08/05/20  1134   HIAGAB Nonreactive     UA  Recent Labs   Lab Test 08/05/20  1135   COLOR Yellow   APPEARANCE Clear   URINEGLC Negative   URINEBILI Negative   SG 1.010   URINEPH 7.0   PROTEIN Negative   UROBILINOGEN 0.2   NITRITE Negative   UBLD Negative   LEUKEST Negative     Urine Protein  Recent Labs   Lab Test 08/05/20  1135   UTP <0.05   UTPG Unable to calculate due to low value   UCRR 30       FRANCINE panel per patient from [facility] [place], [facility] 0.2 (0.0-0.9)  SSA negative  SSB negative  RNP negative  Sm negative  Scl-70 negative  dsDNA negative  Farrah-1 negative  Centromere negative    Immunization History     There is no immunization history on file for this patient.       Chart documentation done in part with Dragon Voice recognition Software. Although reviewed after completion, some word and grammatical error may remain.        Video-Visit Details    Type of service:  Video Visit    Video Start Time: 9:42 AM  Video End Time: 9:54 AM    Originating Location (pt. Location):  work in [place]    Distant Location (provider location):  Home    Platform used for Video Visit: Stiven Alejo MD

## 2020-08-11 NOTE — Clinical Note
Lis,  Please fax my note to Antonina Miranda at [facility]. This is her PCP  Thanks!  Omar

## 2020-08-19 NOTE — NURSING NOTE
Clinic notes faxed to [facility] at [facility]  Lis Alexander CMA Rheumatology  8/19/2020 11:58 AM

## 2020-09-04 DIAGNOSIS — M06.4 INFLAMMATORY POLYARTHROPATHY (H): ICD-10-CM

## 2020-09-04 DIAGNOSIS — L93.2 DRUG-INDUCED LUPUS ERYTHEMATOSUS: ICD-10-CM

## 2020-09-04 DIAGNOSIS — T50.905A DRUG-INDUCED LUPUS ERYTHEMATOSUS: ICD-10-CM

## 2020-09-04 RX ORDER — PREDNISONE 5 MG/1
TABLET ORAL
Qty: 104 TABLET | Refills: 0 | Status: CANCELLED | OUTPATIENT
Start: 2020-09-04

## 2020-09-04 NOTE — TELEPHONE ENCOUNTER
Called patient who said she took 10 mg prednisone daily for 2 weeks and then tapered to 5 mg daily. She reports she has about 3 days worth of prednisone left. Per the last prescription quantity, patient should still have a sufficient supply. Contacted patient's pharmacy and they said that patient was only able to receive a 30 day supply due to her insurance. They have a new prescription ready to be filled for the patient today and will call to inform her.    Andres Washburn RN....9/4/2020 2:05 PM

## 2020-09-29 ENCOUNTER — OFFICE VISIT (OUTPATIENT)
Dept: RHEUMATOLOGY | Facility: CLINIC | Age: 25
End: 2020-09-29
Payer: COMMERCIAL

## 2020-09-29 VITALS
DIASTOLIC BLOOD PRESSURE: 74 MMHG | OXYGEN SATURATION: 97 % | HEART RATE: 95 BPM | SYSTOLIC BLOOD PRESSURE: 136 MMHG | TEMPERATURE: 98.2 F | WEIGHT: 276 LBS

## 2020-09-29 DIAGNOSIS — M06.4 INFLAMMATORY POLYARTHROPATHY (H): ICD-10-CM

## 2020-09-29 DIAGNOSIS — R20.0 NUMBNESS AND TINGLING IN BOTH HANDS: Primary | ICD-10-CM

## 2020-09-29 DIAGNOSIS — R20.2 NUMBNESS AND TINGLING IN BOTH HANDS: Primary | ICD-10-CM

## 2020-09-29 DIAGNOSIS — M22.2X2 PATELLOFEMORAL PAIN SYNDROME OF BOTH KNEES: ICD-10-CM

## 2020-09-29 DIAGNOSIS — L73.2 HIDRADENITIS SUPPURATIVA: ICD-10-CM

## 2020-09-29 DIAGNOSIS — M22.2X1 PATELLOFEMORAL PAIN SYNDROME OF BOTH KNEES: ICD-10-CM

## 2020-09-29 DIAGNOSIS — F17.200 TOBACCO USE DISORDER: ICD-10-CM

## 2020-09-29 PROCEDURE — 99214 OFFICE O/P EST MOD 30 MIN: CPT | Performed by: INTERNAL MEDICINE

## 2020-09-29 NOTE — PATIENT INSTRUCTIONS
Please call to schedule an EMG / Nerve Conduction Study at the [facility] in [place]: [phone]    Do not take prednisone for 7 days prior to the EMG / Nerve Conduction Study

## 2020-09-29 NOTE — PROGRESS NOTES
Rheumatology Clinic Visit      Sugar Bonilla MRN# [id number]   YOB: 1995 Age: 24 year old      Date of visit: 9/29/20   Referring provider: Dr. Wyatt Washington  PCP: Antonina Miranda at [facility]  Patient gives verbal consent to send my note via fax to her PCP    Chief Complaint   Patient presents with:  RECHECK      Assessment and Plan     1. 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  FRANCINE and histone antibodies may be found in non-drug induced lupus; and they could be present simply due to Humira use (used for hidradenitis suppurativa).  She is now off of Humira.  Currently on prednisone 5 mg daily and hydroxychloroquine 200 mg twice daily.  Mild swelling of the MCPs that hopefully will improve with a longer duration of hydroxychloroquine.  Continue hydroxychloroquine and prednisone.  - Continue hydroxychloroquine 200mg BID  - Continue prednisone 5mg daily thereafter.   - Labs in Nov/Dec: CBC, CMP, ESR, CRP, FRANCINE, MELLISSA, dsDNA, C3, C4, APS labs, RF, CCP, Lyme, histone, Hepatitis B, HIV, CK, UA, Uprotein:creatinine    # Pregnancy planning: sexually active and not on birth control    2. Hidradenitis suppurativa: following with dermatology. Was doing well on Humira; mild return of symptoms since stopping Humira but overall doing well does not want to change what she is doing she says.     2.  Numbness and tingling of both hands, especially at night: Not clearly the distribution for carpal tunnel syndrome.  Advised nerve conduction study.  Discussed conservative treatment with keeping her elbow straight and wrist splints; advised that she call to schedule the nerve conduction study.  - EMG/NCS    3.  Bilateral patellofemoral syndrome: Reviewed the diagnosis treatment options.  Refer to physical therapy    4. Cigarette Smoking:   Encouraged complete cessation and discussed the health benefits.      # Considered to be at high risk of complications from the COVID-19 virus.  It is recommended to limit contact with other people and if possible to work remotely or provide a leave of absence to reduce the risk for COVID-19.      Ms. Bonilla verbalized agreement with and understanding of the rational for the diagnosis and treatment plan.  All questions were answered to best of my ability and the patient's satisfaction. Ms. Bonilla was advised to contact the clinic with any questions that may arise after the clinic visit.      Thank you for involving me in the care of the patient    Return to clinic: 3 months      HPI   Sugar Bonilla is a 24 year old female with a past medical history significant for hidradenitis suppurativa who is seen in consultation at the request of Dr. Washington for evaluation of drug induced lupus.    8/4/2020: Ms. Bonilla reported that she was on Humira since Nov 2019 for hidradenitis suppurativa.  In June 2020 she was having joint pain and weakness.  Joint pain started after horseback riding in May; splinted the right wrist.  Then two days later swelling in the right knee.  Then the left wrist for a couple days.  Then since then with joint pain on and off including wrists most often, elbows, knees, shoulders. Trouble walking or dressing herself at one point. Off Humira since mid-June 2020.  Since mid-June the joint aches have been daily, symmetric or asymmetric joint involvement each day. Morning stiffness most of the day; better with activity and worse with inactivity. Worse hidradenitis suppurativa symptoms.  Deep breath results in mild left pleuritic pain, on and off, worse since she sneezed 4 days ago; worse if laying on the L>R side. No shortness of breath.     Doxycycline for hidradenitis suppurativa now; doesn't want to use it though because of associated GI upset in the past with it.  Hasn't started  it    Not on birth control currently. Sexually active now. Trying for pregnancy.     +Fatigue.  Says that anxiety and depression has been an issue.  tx'd with hydroxyzine that has helped sleep    8/11/2020: Presenting sooner to clinic because of left rib cage pain.  Reports going to the ED - pain with deep breath, sneeze; pain on the lower left rib cage, pain for 2 weeks; worse with laying on that side.  Pain is also with pushing on the left rib cage.  She notes that there were lung abnormality seen on a chest CT noticed by her PCP so she is going to have a repeat chest CT performed at the direction of her PCP to further evaluate.  She was given a higher dose of steroids in the emergency department, per patient, that has helped with the pain.  Emergency department note documents diagnosis of acute low back pain without sciatica.      Today: Bilateral knee pain worse with activity and improved with rest.  Knee pain is worse with going up and down stairs.  Numbness and tingling of the hands especially at night, affecting all fingers of the hand.  She says that she can straighten her elbows and she got her hands and this improves.  Numbness tingling does not occur during the day.  Tolerating hydroxychloroquine.  Hidradenitis suppurativa has returned some but is very minimal and she does not want to do anything specific for it at this time.    Denies fevers, chills, nausea, vomiting, constipation, diarrhea. No abdominal pain. No chest pain/pressure, palpitations, or shortness of breath. No LE swelling. No neck pain. No oral or nasal sores.  No sicca symptoms. No photosensitivity or photophobia. No eye pain or redness. No history of inflammatory eye disease.  No history of inflammatory bowel disease. No history of DVT, pulmonary embolism, or miscarriage.   No history of serositis.  No history of Raynaud's Phenomenon.  No known seizure disorder.  No known renal disorder.      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     ROS   GEN: No fevers, chills, night sweats, or weight change  SKIN: See HPI  HEENT:No oral or nasal ulcers.  CV: No chest pain, pressure, palpitations, or dyspnea on exertion; but note the left rib cage pain noted in the HPI.  PULM: No SOB, wheeze, cough.  GI: No nausea, vomiting, constipation, diarrhea. No blood in stool. No abdominal pain.  : No blood in urine.  MSK: See HPI.  NEURO: See HPI  ENDO: No heat/cold intolerance.  EXT: No LE swelling  PSYCH: Negative    Active Problem List   There is no problem list on file for this patient.    Past Medical History   History reviewed. No pertinent past medical history.  Past Surgical History   History reviewed. No pertinent surgical history.  Allergy     Allergies   Allergen Reactions     Cefaclor Nausea     Current Medication List     Current Outpatient Medications   Medication Sig     aluminum chloride (DRYSOL) 20 % external solution Apply topically At Bedtime     baclofen (LIORESAL) 10 MG tablet Take 10 mg by mouth daily     FLUoxetine (PROZAC) 40 MG capsule Take 40 mg by mouth daily     fluticasone (FLONASE) 50 MCG/ACT nasal spray Spray 2 sprays into both nostrils daily     hydroxychloroquine (PLAQUENIL) 200 MG tablet Take 1 tablet (200 mg) by mouth 2 times daily     hydrOXYzine (ATARAX) 25 MG tablet Take 25 mg by mouth daily     omeprazole (PRILOSEC) 20 MG DR capsule Take 20 mg by mouth daily     traZODone (DESYREL) 100 MG tablet Take 100 mg by mouth     adalimumab (HUMIRA *CF*) 80 MG/0.8ML & 40MG/0.4ML pen kit Inject 40 mg subcutaneous once weekly. (Patient not taking: Reported on 7/28/2020)     adalimumab (HUMIRA) 40 MG/0.8ML prefilled syringe kit 40 mg injections on Day 1, 80 mg two weeks later (Day 15). Begin 40 mg weekly dosing two weeks later (Day 29). (Patient not taking: Reported on 7/28/2020)      chlorhexidine (HIBICLENS) 4 % liquid Wash axilla, groin daily do not put on face (Patient not taking: Reported on 8/3/2020)     chlorhexidine (HIBICLENS) 4 % liquid Apply topically daily as needed for wound care Wash axilla, groin daily DO NOT PUT ON FACE (Patient not taking: Reported on 1/22/2020)     clindamycin-benzoyl peroxide (BENZACLIN) 1-5 % external gel Apply topically 2 times daily (Patient not taking: Reported on 8/3/2020)     clindamycin-benzoyl peroxide (BENZACLIN) 1-5 % external gel Apply topically 2 times daily To axilla and groin (Patient not taking: Reported on 1/22/2020)     doxycycline monohydrate (MONODOX) 100 MG capsule Take 1 capsule (100 mg) by mouth 2 times daily (Patient not taking: Reported on 8/3/2020)     doxycycline monohydrate (MONODOX) 100 MG capsule Take 1 capsule (100 mg) by mouth 2 times daily (Patient not taking: Reported on 1/22/2020)     FLUoxetine (PROZAC) 20 MG capsule Take 20 mg by mouth     oxyCODONE (OXY-IR) 5 MG capsule Take 5 mg by mouth every 4 hours as needed for severe pain     oxyCODONE-acetaminophen (PERCOCET)  MG per tablet      predniSONE (DELTASONE) 5 MG tablet Prednisone 10mg daily for 2weeks, then 5mg daily thereafter.     SUMAtriptan (IMITREX) 50 MG tablet Take 50 mg by mouth as needed      No current facility-administered medications for this visit.          Social History   See HPI    Family History   History reviewed. No pertinent family history.       Physical Exam     Temp Readings from Last 3 Encounters:   09/29/20 98.2  F (36.8  C) (Oral)     BP Readings from Last 5 Encounters:   09/29/20 136/74   02/19/20 120/69   01/22/20 112/70   11/13/19 112/67   08/13/19 108/73     Pulse Readings from Last 1 Encounters:   09/29/20 95     Resp Readings from Last 1 Encounters:   No data found for Resp     There is no height or weight on file to calculate BMI.      GEN: NAD. Healthy appearing adult.   HEENT: MMM.  Anicteric, noninjected sclera. No obvious external  lesions of the ear and nose. Hearing intact.  PULM: No increased work of breathing; no use of accessory muscles.  MSK:  Mild swelling and tenderness palpation of the bilateral second MCPs.  Other MCPs and PIPs without swelling or tenderness palpation.  DIPs without swelling or tenderness to palpation.  Wrists, elbows, and shoulders without swelling or tenderness to palpation.  Hips mildly tender to palpation on the lateral aspect.  Knees with mild medial joint line tenderness and crepitation; no effusion or increased warmth.  Ankles and MTPs without swelling or tenderness to palpation.   NEURO: Negative Tinel's over the elbows and wrists.    SKIN: No rash or jaundice seen.   PSYCH: Alert. Appropriate.         Labs / Imaging (select studies)   RF/CCP  Recent Labs   Lab Test 08/05/20  1134   CCPIGG 2   RHF <7     FRANCINE  Recent Labs   Lab Test 08/05/20 1134 07/01/20  1708   GRAY Positive* Positive*   ANAP1 HOMOGENEOUS HOMOGENEOUS   ANAT1 1:640 1:1,280     RNP/Sm/SSA/SSB  Recent Labs   Lab Test 08/05/20  1134   RNPIGG <0.2   SMIGG <0.2   SSAIGG <0.2   SSBIGG <0.2     dsDNA  Recent Labs   Lab Test 08/05/20  1134 07/01/20  1708   DNA 3 4     C3/C4  Recent Labs   Lab Test 08/05/20  1134   T3NVRZW 165*   G1HGNDW 24     Histone Antibody  Recent Labs   Lab Test 08/05/20  1134 07/01/20  1708   HSTO 2.1* 2.1*     Antiphospholipid Antibodies  Recent Labs   Lab Test 08/05/20  1134   B2GPG 1.0   B2GPM <2.9   CARDG <1.6   CARDM 0.8   LUPINT Negative     CBC  Recent Labs   Lab Test 08/05/20 1134 01/22/20  1355   WBC 7.6 11.6*   RBC 4.73 4.87   HGB 14.3 14.9   HCT 41.6 45.5   MCV 88 93   RDW 12.2 12.6    313   MCH 30.2 30.6   MCHC 34.4 32.7   NEUTROPHIL 71.3  --    LYMPH 24.1  --    MONOCYTE 3.0  --    EOSINOPHIL 1.5  --    BASOPHIL 0.1  --    ANEU 5.4  --    ALYM 1.8  --    SKYLAR 0.2  --    AEOS 0.1  --    ABAS 0.0  --      CMP  Recent Labs   Lab Test 08/05/20  1134 07/01/20  1708 01/22/20  1355    133 140   POTASSIUM  3.6 3.7 3.6   CHLORIDE 103 104 108   CO2 25 23 24   ANIONGAP 5 6 8   * 115* 78   BUN 10 12 15   CR 0.92 1.10* 0.97   GFRESTIMATED 86 69 81   GFRESTBLACK >90 80 >90   BK 8.9 9.1 9.4   BILITOTAL 0.5 0.5 0.5   ALBUMIN 4.0 3.9 3.9   PROTTOTAL 8.4 8.1 7.7   ALKPHOS 89 68 73   AST 35 31 17   ALT 68* 36 33     Calcium/VitaminD  Recent Labs   Lab Test 08/05/20  1134 07/01/20  1708 01/22/20  1355   BK 8.9 9.1 9.4     ESR/CRP  Recent Labs   Lab Test 08/05/20  1134   SED 16   CRP 16.9*     CK/Aldolase  Recent Labs   Lab Test 08/05/20  1134   CKT 93       Hepatitis B  Recent Labs   Lab Test 08/05/20  1134   HBCAB Nonreactive   HEPBANG Nonreactive     Hepatitis C  Recent Labs   Lab Test 11/13/19  1139   HCVAB Nonreactive     Lyme ab screening  Recent Labs   Lab Test 08/05/20  1134   LYMEGM 0.37     Tuberculosis Screening  Recent Labs   Lab Test 11/13/19  1139   TBRES Negative     HIV Screening  Recent Labs   Lab Test 08/05/20  1134   HIAGAB Nonreactive     UA  Recent Labs   Lab Test 08/05/20  1135   COLOR Yellow   APPEARANCE Clear   URINEGLC Negative   URINEBILI Negative   SG 1.010   URINEPH 7.0   PROTEIN Negative   UROBILINOGEN 0.2   NITRITE Negative   UBLD Negative   LEUKEST Negative     Urine Protein  Recent Labs   Lab Test 08/05/20  1135   UTP <0.05   UTPG Unable to calculate due to low value   UCRR 30       FRANCINE panel per patient from [place]  Chromatin 0.2 (0.0-0.9)  SSA negative  SSB negative  RNP negative  Sm negative  Scl-70 negative  dsDNA negative  Farrah-1 negative  Centromere negative    Immunization History     There is no immunization history on file for this patient.       Chart documentation done in part with Dragon Voice recognition Software. Although reviewed after completion, some word and grammatical error may remain.      Omar Alejo MD

## 2020-10-19 RX ORDER — SULFAMETHOXAZOLE/TRIMETHOPRIM 800-160 MG
TABLET ORAL
COMMUNITY
Start: 2020-06-26 | End: 2022-10-03

## 2020-10-19 NOTE — PROGRESS NOTES
"Sugar Bonilla is a 24 year old female who is being evaluated via a billable video visit.      The patient has been notified of following:     \"This video visit will be conducted via a call between you and your physician/provider. We have found that certain health care needs can be provided without the need for an in-person physical exam.  This service lets us provide the care you need with a video conversation.  If a prescription is necessary we can send it directly to your pharmacy.  If lab work is needed we can place an order for that and you can then stop by our lab to have the test done at a later time.    Video visits are billed at different rates depending on your insurance coverage.  Please reach out to your insurance provider with any questions.    If during the course of the call the physician/provider feels a video visit is not appropriate, you will not be charged for this service.\"    Patient has given verbal consent for Video visit? Yes  How would you like to obtain your AVS? MyChart  If you are dropped from the video visit, the video invite should be resent to: Text to cell phone: [phone]  Will anyone else be joining your video visit? No      Rheumatology Video Visit      Sugar Bonilla MRN# [id number]   YOB: 1995 Age: 24 year old      Date of visit: 10/20/20   Referring provider: Dr. Wyatt Washington  PCP: Antonina Miranda at [facility]  Patient gives verbal consent to send my note via fax to her PCP    Chief Complaint   Patient presents with:  Inflammatory polyarthropathy      Assessment and Plan     1. 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  Note that FRANCINE and histone antibodies may be found in non-drug induced lupus; and they could be present simply due to Humira use (used for hidradenitis suppurativa); DIL is suspected though.  No " longer on Humira.  Currently on prednisone 5 mg daily and hydroxychloroquine 200 mg twice daily.  Mild swelling of the MCPs that hopefully will improve with a longer duration of hydroxychloroquine.  Continue hydroxychloroquine and prednisone.  - Continue hydroxychloroquine 200mg BID  - Continue prednisone 5mg daily thereafter.   - Labs in November: CBC, CMP, ESR, CRP, CK, C3, C4, dsDNA, histone, UA, Uprotein:creatinine    # Pregnancy planning: sexually active and not on birth control    2. Hidradenitis suppurativa: following with dermatology. Was doing well on Humira; mild return of symptoms since stopping Humira but overall doing well does not want to change what she is doing she says.     2.  Numbness and tingling of both hands, especially at night: Not clearly the distribution for carpal tunnel syndrome.  NCS is scheduled already  - EMG/NCS    3. Bilateral patellofemoral syndrome: Reviewed the diagnosis treatment options.  Referred to physical therapy but she couldn't find the referral so never went; will mail the referral    4.  Impingement syndrome of both shoulders: Reviewed the diagnosis and treatment options.  Refer to physical therapy  -PT referral    5. Cigarette Smoking:  Encouraged complete cessation and discussed the health benefits.      6.  Vaccinations: Vaccinations reviewed with MsBoston Nyen.  Risks and benefits of vaccinations were discussed.    - Influenza: encouraged yearly vaccination    # Relevant labs and imaging were reviewed with the patient    # High risk medication toxicity monitoring: discussion and labs reviewed; appropriate labs ordered. See above.  Instructed that if confirmed to have COVID-19 or exposure to someone with confirmed COVID-19 to call this clinic for directions on DMARD management.    # Note that this is a virtual visit to reduce the risk of COVID-19 exposure during this current pandemic.      # Considered to be at high risk of complications from the COVID-19 virus.  It  is recommended to limit contact with other people and if possible to work remotely or provide a leave of absence to reduce the risk for COVID-19.      Ms. Bonilla verbalized agreement with and understanding of the rational for the diagnosis and treatment plan.  All questions were answered to best of my ability and the patient's satisfaction. Ms. Bonilla was advised to contact the clinic with any questions that may arise after the clinic visit.      Thank you for involving me in the care of the patient    Return to clinic: 3 months      HPI   Sugar Bonilla is a 24 year old female with a past medical history significant for hidradenitis suppurativa who is seen for follow-up of arthritis..    8/4/2020: Ms. Bonilla reported that she was on Humira since Nov 2019 for hidradenitis suppurativa.  In June 2020 she was having joint pain and weakness.  Joint pain started after horseback riding in May; splinted the right wrist.  Then two days later swelling in the right knee.  Then the left wrist for a couple days.  Then since then with joint pain on and off including wrists most often, elbows, knees, shoulders. Trouble walking or dressing herself at one point. Off Humira since mid-June 2020.  Since mid-June the joint aches have been daily, symmetric or asymmetric joint involvement each day. Morning stiffness most of the day; better with activity and worse with inactivity. Worse hidradenitis suppurativa symptoms.  Deep breath results in mild left pleuritic pain, on and off, worse since she sneezed 4 days ago; worse if laying on the L>R side. No shortness of breath.     Doxycycline for hidradenitis suppurativa now; doesn't want to use it though because of associated GI upset in the past with it.  Hasn't started it    Not on birth control currently. Sexually active now. Trying for pregnancy.     +Fatigue.  Says that anxiety and depression has been an issue.  tx'd with hydroxyzine that has helped sleep    8/11/2020:  Presenting sooner to clinic because of left rib cage pain.  Reports going to the ED - pain with deep breath, sneeze; pain on the lower left rib cage, pain for 2 weeks; worse with laying on that side.  Pain is also with pushing on the left rib cage.  She notes that there were lung abnormality seen on a chest CT noticed by her PCP so she is going to have a repeat chest CT performed at the direction of her PCP to further evaluate.  She was given a higher dose of steroids in the emergency department, per patient, that has helped with the pain.  Emergency department note documents diagnosis of acute low back pain without sciatica.      9/29/2020: Bilateral knee pain worse with activity and improved with rest.  Knee pain is worse with going up and down stairs.  Numbness and tingling of the hands especially at night, affecting all fingers of the hand.  She says that she can straighten her elbows and she got her hands and this improves.  Numbness tingling does not occur during the day.  Tolerating hydroxychloroquine.  Hidradenitis suppurativa has returned some but is very minimal and she does not want to do anything specific for it at this time.    Today, 10/20/2020: bilateral shoulder pain/tightness; worse with any activity giving the example of folding laundry and doing dishes.  No shoulder swelling.  Shoulder pain is worse with over the head activity.  Hands are feeling better.  Has not started physical therapy for her knees because she could not find the physical therapy referral.  Has the nerve conduction study scheduled.    Denies fevers, chills, nausea, vomiting, constipation, diarrhea. No abdominal pain. No chest pain/pressure, palpitations, or shortness of breath. No LE swelling. No neck pain. No oral or nasal sores.  No sicca symptoms. No photosensitivity or photophobia. No eye pain or redness. No history of inflammatory eye disease.  No history of inflammatory bowel disease. No history of DVT, pulmonary embolism,  or miscarriage.   No history of serositis.  No history of Raynaud's Phenomenon.  No known seizure disorder.  No known renal disorder.      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     ROS   GEN: No fevers, chills, night sweats, or weight change  SKIN: See HPI  HEENT:No oral or nasal ulcers.  CV: No chest pain, pressure, palpitations, or dyspnea on exertion; but note the left rib cage pain noted in the HPI.  PULM: No SOB, wheeze, cough.  GI: No nausea, vomiting, constipation, diarrhea. No blood in stool. No abdominal pain.  : No blood in urine.  MSK: See HPI.  NEURO: See HPI  ENDO: No heat/cold intolerance.  EXT: No LE swelling  PSYCH: Negative    Active Problem List   There is no problem list on file for this patient.    Past Medical History   History reviewed. No pertinent past medical history.  Past Surgical History   History reviewed. No pertinent surgical history.  Allergy     Allergies   Allergen Reactions     Cefaclor Nausea     Current Medication List     Current Outpatient Medications   Medication Sig     aluminum chloride (DRYSOL) 20 % external solution Apply topically At Bedtime     baclofen (LIORESAL) 10 MG tablet Take 10 mg by mouth daily     FLUoxetine (PROZAC) 20 MG capsule Take 20 mg by mouth     FLUoxetine (PROZAC) 40 MG capsule Take 40 mg by mouth daily     fluticasone (FLONASE) 50 MCG/ACT nasal spray Spray 2 sprays into both nostrils daily     hydroxychloroquine (PLAQUENIL) 200 MG tablet Take 1 tablet (200 mg) by mouth 2 times daily     hydrOXYzine (ATARAX) 25 MG tablet Take 25 mg by mouth daily     omeprazole (PRILOSEC) 20 MG DR capsule Take 20 mg by mouth daily     predniSONE (DELTASONE) 5 MG tablet Prednisone 10mg daily for 2weeks, then 5mg daily thereafter.     sulfamethoxazole-trimethoprim (BACTRIM DS) 800-160 MG tablet      traZODone  (DESYREL) 100 MG tablet Take 100 mg by mouth     adalimumab (HUMIRA *CF*) 80 MG/0.8ML & 40MG/0.4ML pen kit Inject 40 mg subcutaneous once weekly. (Patient not taking: Reported on 7/28/2020)     adalimumab (HUMIRA) 40 MG/0.8ML prefilled syringe kit 40 mg injections on Day 1, 80 mg two weeks later (Day 15). Begin 40 mg weekly dosing two weeks later (Day 29). (Patient not taking: Reported on 7/28/2020)     chlorhexidine (HIBICLENS) 4 % liquid Wash axilla, groin daily do not put on face (Patient not taking: Reported on 8/3/2020)     chlorhexidine (HIBICLENS) 4 % liquid Apply topically daily as needed for wound care Wash axilla, groin daily DO NOT PUT ON FACE (Patient not taking: Reported on 1/22/2020)     clindamycin-benzoyl peroxide (BENZACLIN) 1-5 % external gel Apply topically 2 times daily (Patient not taking: Reported on 8/3/2020)     clindamycin-benzoyl peroxide (BENZACLIN) 1-5 % external gel Apply topically 2 times daily To axilla and groin (Patient not taking: Reported on 1/22/2020)     doxycycline monohydrate (MONODOX) 100 MG capsule Take 1 capsule (100 mg) by mouth 2 times daily (Patient not taking: Reported on 8/3/2020)     doxycycline monohydrate (MONODOX) 100 MG capsule Take 1 capsule (100 mg) by mouth 2 times daily (Patient not taking: Reported on 1/22/2020)     oxyCODONE (OXY-IR) 5 MG capsule Take 5 mg by mouth every 4 hours as needed for severe pain     oxyCODONE-acetaminophen (PERCOCET)  MG per tablet      SUMAtriptan (IMITREX) 50 MG tablet Take 50 mg by mouth as needed      No current facility-administered medications for this visit.          Social History   See HPI    Family History   History reviewed. No pertinent family history.       Physical Exam     Temp Readings from Last 3 Encounters:   09/29/20 98.2  F (36.8  C) (Oral)     BP Readings from Last 5 Encounters:   09/29/20 136/74   02/19/20 120/69   01/22/20 112/70   11/13/19 112/67   08/13/19 108/73     Pulse Readings from Last 1  Encounters:   09/29/20 95     GEN: NAD. Obese  HEENT: MMM.  Anicteric, noninjected sclera. No obvious external lesions of the ear and nose. Hearing intact.  PULM: No increased work of breathing  MSK: MCPs, PIPs, DIPs without swelling.  Wrist without swelling.  Elbows without swelling.  Shoulders with normal range of motion but mildly painful with abduction above 90 degrees.  SKIN: No rash or jaundice seen.   PSYCH: Alert. Appropriate.         Labs / Imaging (select studies)   RF/CCP  Recent Labs   Lab Test 08/05/20  1134   CCPIGG 2   RHF <7     FRANCINE  Recent Labs   Lab Test 08/05/20  1134 07/01/20  1708   GRAY Positive* Positive*   ANAP1 HOMOGENEOUS HOMOGENEOUS   ANAT1 1:640 1:1,280     RNP/Sm/SSA/SSB  Recent Labs   Lab Test 08/05/20  1134   RNPIGG <0.2   SMIGG <0.2   SSAIGG <0.2   SSBIGG <0.2     dsDNA  Recent Labs   Lab Test 08/05/20  1134 07/01/20  1708   DNA 3 4     C3/C4  Recent Labs   Lab Test 08/05/20  1134   Q5EYOYO 165*   G9HUAIW 24     Histone Antibody  Recent Labs   Lab Test 08/05/20  1134 07/01/20  1708   HSTO 2.1* 2.1*     Antiphospholipid Antibodies  Recent Labs   Lab Test 08/05/20  1134   B2GPG 1.0   B2GPM <2.9   CARDG <1.6   CARDM 0.8   LUPINT Negative     CBC  Recent Labs   Lab Test 08/05/20 1134 01/22/20  1355   WBC 7.6 11.6*   RBC 4.73 4.87   HGB 14.3 14.9   HCT 41.6 45.5   MCV 88 93   RDW 12.2 12.6    313   MCH 30.2 30.6   MCHC 34.4 32.7   NEUTROPHIL 71.3  --    LYMPH 24.1  --    MONOCYTE 3.0  --    EOSINOPHIL 1.5  --    BASOPHIL 0.1  --    ANEU 5.4  --    ALYM 1.8  --    SKYLAR 0.2  --    AEOS 0.1  --    ABAS 0.0  --      CMP  Recent Labs   Lab Test 08/05/20  1134 07/01/20  1708 01/22/20  1355    133 140   POTASSIUM 3.6 3.7 3.6   CHLORIDE 103 104 108   CO2 25 23 24   ANIONGAP 5 6 8   * 115* 78   BUN 10 12 15   CR 0.92 1.10* 0.97   GFRESTIMATED 86 69 81   GFRESTBLACK >90 80 >90   BK 8.9 9.1 9.4   BILITOTAL 0.5 0.5 0.5   ALBUMIN 4.0 3.9 3.9   PROTTOTAL 8.4 8.1 7.7   ALKPHOS 89 68  73   AST 35 31 17   ALT 68* 36 33     Calcium/VitaminD  Recent Labs   Lab Test 08/05/20  1134 07/01/20  1708 01/22/20  1355   BK 8.9 9.1 9.4     ESR/CRP  Recent Labs   Lab Test 08/05/20  1134   SED 16   CRP 16.9*     CK/Aldolase  Recent Labs   Lab Test 08/05/20  1134   CKT 93     Hepatitis B  Recent Labs   Lab Test 08/05/20  1134   HBCAB Nonreactive   HEPBANG Nonreactive     Hepatitis C  Recent Labs   Lab Test 11/13/19  1139   HCVAB Nonreactive     Lyme ab screening  Recent Labs   Lab Test 08/05/20  1134   LYMEGM 0.37     Tuberculosis Screening  Recent Labs   Lab Test 11/13/19  1139   TBRES Negative     HIV Screening  Recent Labs   Lab Test 08/05/20  1134   HIAGAB Nonreactive     UA  Recent Labs   Lab Test 08/05/20  1135   COLOR Yellow   APPEARANCE Clear   URINEGLC Negative   URINEBILI Negative   SG 1.010   URINEPH 7.0   PROTEIN Negative   UROBILINOGEN 0.2   NITRITE Negative   UBLD Negative   LEUKEST Negative     Urine Protein  Recent Labs   Lab Test 08/05/20  1135   UTP <0.05   UTPG Unable to calculate due to low value   UCRR 30       FRANCINE panel per patient from [facility]  Chromatin 0.2 (0.0-0.9)  SSA negative  SSB negative  RNP negative  Sm negative  Scl-70 negative  dsDNA negative  Farrah-1 negative  Centromere negative    Immunization History     There is no immunization history on file for this patient.       Chart documentation done in part with Dragon Voice recognition Software. Although reviewed after completion, some word and grammatical error may remain.      Video-Visit Details    Type of service:  Video Visit    Video Start Time: 8:02 AM  Video End Time: 8:21 AM    Originating Location (pt. Location): Home, in [place]    Distant Location (provider location):  Home    Platform used for Video Visit: Stiven Alejo MD

## 2020-10-20 ENCOUNTER — VIRTUAL VISIT (OUTPATIENT)
Dept: RHEUMATOLOGY | Facility: CLINIC | Age: 25
End: 2020-10-20
Payer: COMMERCIAL

## 2020-10-20 DIAGNOSIS — M75.42 IMPINGEMENT SYNDROME OF BOTH SHOULDERS: Primary | ICD-10-CM

## 2020-10-20 DIAGNOSIS — M75.41 IMPINGEMENT SYNDROME OF BOTH SHOULDERS: Primary | ICD-10-CM

## 2020-10-20 DIAGNOSIS — R20.0 NUMBNESS AND TINGLING IN BOTH HANDS: ICD-10-CM

## 2020-10-20 DIAGNOSIS — M06.4 INFLAMMATORY POLYARTHROPATHY (H): ICD-10-CM

## 2020-10-20 DIAGNOSIS — L73.2 HIDRADENITIS SUPPURATIVA: ICD-10-CM

## 2020-10-20 DIAGNOSIS — M22.2X1 PATELLOFEMORAL PAIN SYNDROME OF BOTH KNEES: ICD-10-CM

## 2020-10-20 DIAGNOSIS — L93.2 DRUG-INDUCED LUPUS ERYTHEMATOSUS: ICD-10-CM

## 2020-10-20 DIAGNOSIS — R20.2 NUMBNESS AND TINGLING IN BOTH HANDS: ICD-10-CM

## 2020-10-20 DIAGNOSIS — T50.905A DRUG-INDUCED LUPUS ERYTHEMATOSUS: ICD-10-CM

## 2020-10-20 DIAGNOSIS — F17.200 TOBACCO USE DISORDER: ICD-10-CM

## 2020-10-20 DIAGNOSIS — M22.2X2 PATELLOFEMORAL PAIN SYNDROME OF BOTH KNEES: ICD-10-CM

## 2020-10-20 PROCEDURE — 99214 OFFICE O/P EST MOD 30 MIN: CPT | Mod: GT | Performed by: INTERNAL MEDICINE

## 2020-10-20 NOTE — Clinical Note
Rheumatology team: Please mail the physical therapy referral entered today, 10/20/2020.    Thanks!  Omar Alejo MD  10/20/2020 8:33 AM

## 2020-10-21 NOTE — PROGRESS NOTES
Referral for physical therapy has been mailed to patient.  Lis Alexander CMA Rheumatology  10/21/2020 10:37 AM

## 2020-11-09 ENCOUNTER — OFFICE VISIT (OUTPATIENT)
Dept: NEUROLOGY | Facility: CLINIC | Age: 25
End: 2020-11-09
Attending: INTERNAL MEDICINE
Payer: COMMERCIAL

## 2020-11-09 DIAGNOSIS — R20.0 NUMBNESS AND TINGLING IN BOTH HANDS: ICD-10-CM

## 2020-11-09 DIAGNOSIS — T50.905A DRUG-INDUCED LUPUS ERYTHEMATOSUS: Primary | ICD-10-CM

## 2020-11-09 DIAGNOSIS — L93.2 DRUG-INDUCED LUPUS ERYTHEMATOSUS: Primary | ICD-10-CM

## 2020-11-09 DIAGNOSIS — R20.2 NUMBNESS AND TINGLING IN BOTH HANDS: ICD-10-CM

## 2020-11-09 LAB
ALBUMIN SERPL-MCNC: 3.7 G/DL (ref 3.4–5)
ALP SERPL-CCNC: 79 U/L (ref 40–150)
ALT SERPL W P-5'-P-CCNC: 21 U/L (ref 0–50)
ANION GAP SERPL CALCULATED.3IONS-SCNC: 4 MMOL/L (ref 3–14)
AST SERPL W P-5'-P-CCNC: 20 U/L (ref 0–45)
BASOPHILS # BLD AUTO: 0 10E9/L (ref 0–0.2)
BASOPHILS NFR BLD AUTO: 0.3 %
BILIRUB SERPL-MCNC: 0.4 MG/DL (ref 0.2–1.3)
BUN SERPL-MCNC: 19 MG/DL (ref 7–30)
CALCIUM SERPL-MCNC: 8.9 MG/DL (ref 8.5–10.1)
CHLORIDE SERPL-SCNC: 108 MMOL/L (ref 94–109)
CK SERPL-CCNC: 130 U/L (ref 30–225)
CO2 SERPL-SCNC: 27 MMOL/L (ref 20–32)
CREAT SERPL-MCNC: 0.82 MG/DL (ref 0.52–1.04)
DIFFERENTIAL METHOD BLD: NORMAL
EOSINOPHIL # BLD AUTO: 0.2 10E9/L (ref 0–0.7)
EOSINOPHIL NFR BLD AUTO: 2.1 %
ERYTHROCYTE [DISTWIDTH] IN BLOOD BY AUTOMATED COUNT: 12.5 % (ref 10–15)
ERYTHROCYTE [SEDIMENTATION RATE] IN BLOOD BY WESTERGREN METHOD: 13 MM/H (ref 0–20)
GFR SERPL CREATININE-BSD FRML MDRD: >90 ML/MIN/{1.73_M2}
GLUCOSE SERPL-MCNC: 93 MG/DL (ref 70–99)
HCT VFR BLD AUTO: 42.1 % (ref 35–47)
HGB BLD-MCNC: 14.3 G/DL (ref 11.7–15.7)
IMM GRANULOCYTES # BLD: 0 10E9/L (ref 0–0.4)
IMM GRANULOCYTES NFR BLD: 0.4 %
LYMPHOCYTES # BLD AUTO: 2.7 10E9/L (ref 0.8–5.3)
LYMPHOCYTES NFR BLD AUTO: 34.6 %
MCH RBC QN AUTO: 28.7 PG (ref 26.5–33)
MCHC RBC AUTO-ENTMCNC: 34 G/DL (ref 31.5–36.5)
MCV RBC AUTO: 84 FL (ref 78–100)
MONOCYTES # BLD AUTO: 0.4 10E9/L (ref 0–1.3)
MONOCYTES NFR BLD AUTO: 4.6 %
NEUTROPHILS # BLD AUTO: 4.5 10E9/L (ref 1.6–8.3)
NEUTROPHILS NFR BLD AUTO: 58 %
PLATELET # BLD AUTO: 375 10E9/L (ref 150–450)
POTASSIUM SERPL-SCNC: 3.8 MMOL/L (ref 3.4–5.3)
PROT SERPL-MCNC: 7.6 G/DL (ref 6.8–8.8)
RBC # BLD AUTO: 4.99 10E12/L (ref 3.8–5.2)
SODIUM SERPL-SCNC: 139 MMOL/L (ref 133–144)
WBC # BLD AUTO: 7.8 10E9/L (ref 4–11)

## 2020-11-09 PROCEDURE — 86225 DNA ANTIBODY NATIVE: CPT | Performed by: INTERNAL MEDICINE

## 2020-11-09 PROCEDURE — 82550 ASSAY OF CK (CPK): CPT | Performed by: INTERNAL MEDICINE

## 2020-11-09 PROCEDURE — 95886 MUSC TEST DONE W/N TEST COMP: CPT | Performed by: PSYCHIATRY & NEUROLOGY

## 2020-11-09 PROCEDURE — 95913 NRV CNDJ TEST 13/> STUDIES: CPT | Performed by: PSYCHIATRY & NEUROLOGY

## 2020-11-09 PROCEDURE — 86160 COMPLEMENT ANTIGEN: CPT | Performed by: INTERNAL MEDICINE

## 2020-11-09 PROCEDURE — 80053 COMPREHEN METABOLIC PANEL: CPT | Performed by: INTERNAL MEDICINE

## 2020-11-09 PROCEDURE — 86140 C-REACTIVE PROTEIN: CPT | Performed by: INTERNAL MEDICINE

## 2020-11-09 PROCEDURE — 36415 COLL VENOUS BLD VENIPUNCTURE: CPT | Performed by: INTERNAL MEDICINE

## 2020-11-09 PROCEDURE — 85652 RBC SED RATE AUTOMATED: CPT | Performed by: INTERNAL MEDICINE

## 2020-11-09 PROCEDURE — 85025 COMPLETE CBC W/AUTO DIFF WBC: CPT | Performed by: INTERNAL MEDICINE

## 2020-11-09 NOTE — LETTER
11/9/2020         RE: Sugar Bonilla  [address]  [place] [address]        Dear Colleague,    Thank you for referring your patient, Sugar Bonilla, to the [facility] [place]. Please see a copy of my visit note below.    [facility]   EMG Laboratory      Nerve Conduction & EMG Report          Patient:       Sugar Bonilla  Patient ID:    [id number]  Gender:        Female  YOB: 1995  Age:           24 Years 11 Months      History and Examination:  Sugar Bonilla is a 24 year old woman with episodic weakness and sensory symptoms in bilateral upper limbs, as well as pain that is generally more prominent on the left. Examination demonstrates preserved strength and brisk biceps reflexes. She is referred for further evaluation of these symptoms.     Techniques:  Motor conduction studies were done with surface recording electrodes. Sensory conduction studies were performed with surface electrodes, unless indicated otherwise by (n), designating the use of subdermal recording electrodes. Temperature was monitored and recorded throughout the study. Upper extremities were maintained at a temperature of 32 degrees Centigrade or higher.  EMG was done with a concentric needle electrode.     Results:  Bilateral median, bilateral ulnar, and left radial sensory conduction studies were normal. Bilateral median and ulnar motor conduction studies were normal.     Interpretation:  This is a normal study. In particular, there is no electrophysiologic evidence of entrapment neuropathy or left cervical radiculopathy.    Bud Restrepo MD        Sensory NCS      Nerve / Sites Rec. Site Onset Peak NP Amp Ref. PP Amp Dist Yuval Ref. Temp     ms ms  V  V  V cm m/s m/s  C   L MEDIAN - Dig II Anti      Wrist Dig II 1.98 2.71 26.8 10.0 41.9 13 65.7 48.0 32.6   R MEDIAN - Dig II Anti      Wrist Dig II 2.03 2.81 35.0 10.0 52.3 14 68.9 48.0 33.3   L ULNAR - Dig V Anti       Wrist Dig V 2.08 2.76 33.2 8.0 51.7 12.5 60.0 48.0 32.4   R ULNAR - Dig V Anti      Wrist Dig V 2.19 2.76 36.3 8.0 53.2 12.5 57.1 48.0 33.5   L RADIAL - Snuff      Forearm Snuff 1.56 2.14 35.6 15.0 58.5 10.5 67.2 48.0 32.9       Motor NCS      Nerve / Sites Rec. Site Lat Ref. Amp Ref. Rel Amp Dist Yuval Ref. Dur. Area Temp.     ms ms mV mV % cm m/s m/s ms %  C   L MEDIAN - APB      Wrist APB 2.81 4.40 10.0 5.0 100 8   4.95 100 33.4      Elbow APB 6.72  8.9  89.2 23 58.9 48.0 5.05 85.8 33.4   R MEDIAN - APB      Wrist APB 2.86 4.40 6.6 5.0 100 8   5.05 100 33.4      Elbow APB 6.88  6.6  101 23 57.4 48.0 5.68 98.3 33.5   L ULNAR - ADM      Wrist ADM 2.24 3.50 10.9 5.0 100 8   5.63 100 33.3      B.Elbow ADM 5.78  10.4  94.9 20 56.5 48.0 5.10 96.9 33.3      A.Elbow ADM 7.45  10.4  94.9 9 54.0 48.0 5.57 87.8 33.3   R ULNAR - ADM      Wrist ADM 2.45 3.50 13.9 5.0 100 8   5.16 100 33.5      B.Elbow ADM 6.04  10.9  78.4 22 61.2 48.0 5.05 81.6 33.6      A.Elbow ADM 7.92  10.3  74.5 10 53.3 48.0 5.26 75.3 33.6   L MEDIAN - II Lumb      Median II Lumb 2.76  2.0  100 10   6.41 100 33.3      Ulnar Palm Int 2.66  5.1  252 10   5.36 158 33.3   R MEDIAN - II Lumb      Median II Lumb 2.92  2.1  100 10   5.21 100 33.5      Ulnar Palm Int 2.92  3.3  160 10   5.26 119 33.5   L ULNAR - FDI      Wrist FDI 2.71  16.1  100    5.68 100 32.9      B.Elbow FDI 6.20  12.7  79.2 21 60.2  5.42 79.1 32.9      A.Elbow FDI 7.71  12.4  77.3 9 59.6  5.47 77.7 32.9   R ULNAR - FDI      Wrist FDI 2.66  13.5  100    5.78 100 33.4      B.Elbow FDI 6.04  13.3  98.6 21 62.0  5.99 103 33.4      A.Elbow FDI 7.45  12.1  89.9 9 64.0  6.30 103 33.5       EMG Summary Table     Spontaneous MUAP Recruitment    IA Fib PSW Fasc H.F. Amp Dur. PPP Pattern   L. PRON TERES N None None None None N N N N   L. FIRST D INTEROSS N None None None None N N N N   L. EXT DIG COMM N None None None None N N N N   L. BICEPS N None None None None N N N N   L. TRICEPS N None None None  None N N N N   L. DELTOID N None None None None N N N N   L. C7 PSP N None None None None N N N N                                        Again, thank you for allowing me to participate in the care of your patient.        Sincerely,        Bud Restrepo MD

## 2020-11-09 NOTE — PROGRESS NOTES
[facility]   EMG Laboratory      Nerve Conduction & EMG Report          Patient:       Sugar Bonilla  Patient ID:    [id number]  Gender:        Female  YOB: 1995  Age:           24 Years 11 Months      History and Examination:  Sugar Bonilla is a 24 year old woman with episodic weakness and sensory symptoms in bilateral upper limbs, as well as pain that is generally more prominent on the left. Examination demonstrates preserved strength and brisk biceps reflexes. She is referred for further evaluation of these symptoms.     Techniques:  Motor conduction studies were done with surface recording electrodes. Sensory conduction studies were performed with surface electrodes, unless indicated otherwise by (n), designating the use of subdermal recording electrodes. Temperature was monitored and recorded throughout the study. Upper extremities were maintained at a temperature of 32 degrees Centigrade or higher.  EMG was done with a concentric needle electrode.     Results:  Bilateral median, bilateral ulnar, and left radial sensory conduction studies were normal. Bilateral median and ulnar motor conduction studies were normal.     Interpretation:  This is a normal study. In particular, there is no electrophysiologic evidence of entrapment neuropathy or left cervical radiculopathy.    Bud Restrepo MD        Sensory NCS      Nerve / Sites Rec. Site Onset Peak NP Amp Ref. PP Amp Dist Yuval Ref. Temp     ms ms  V  V  V cm m/s m/s  C   L MEDIAN - Dig II Anti      Wrist Dig II 1.98 2.71 26.8 10.0 41.9 13 65.7 48.0 32.6   R MEDIAN - Dig II Anti      Wrist Dig II 2.03 2.81 35.0 10.0 52.3 14 68.9 48.0 33.3   L ULNAR - Dig V Anti      Wrist Dig V 2.08 2.76 33.2 8.0 51.7 12.5 60.0 48.0 32.4   R ULNAR - Dig V Anti      Wrist Dig V 2.19 2.76 36.3 8.0 53.2 12.5 57.1 48.0 33.5   L RADIAL - Snuff      Forearm Snuff 1.56 2.14 35.6 15.0 58.5 10.5 67.2 48.0 32.9       Motor NCS      Nerve / Sites Rec. Site Lat  Ref. Amp Ref. Rel Amp Dist Yuval Ref. Dur. Area Temp.     ms ms mV mV % cm m/s m/s ms %  C   L MEDIAN - APB      Wrist APB 2.81 4.40 10.0 5.0 100 8   4.95 100 33.4      Elbow APB 6.72  8.9  89.2 23 58.9 48.0 5.05 85.8 33.4   R MEDIAN - APB      Wrist APB 2.86 4.40 6.6 5.0 100 8   5.05 100 33.4      Elbow APB 6.88  6.6  101 23 57.4 48.0 5.68 98.3 33.5   L ULNAR - ADM      Wrist ADM 2.24 3.50 10.9 5.0 100 8   5.63 100 33.3      B.Elbow ADM 5.78  10.4  94.9 20 56.5 48.0 5.10 96.9 33.3      A.Elbow ADM 7.45  10.4  94.9 9 54.0 48.0 5.57 87.8 33.3   R ULNAR - ADM      Wrist ADM 2.45 3.50 13.9 5.0 100 8   5.16 100 33.5      B.Elbow ADM 6.04  10.9  78.4 22 61.2 48.0 5.05 81.6 33.6      A.Elbow ADM 7.92  10.3  74.5 10 53.3 48.0 5.26 75.3 33.6   L MEDIAN - II Lumb      Median II Lumb 2.76  2.0  100 10   6.41 100 33.3      Ulnar Palm Int 2.66  5.1  252 10   5.36 158 33.3   R MEDIAN - II Lumb      Median II Lumb 2.92  2.1  100 10   5.21 100 33.5      Ulnar Palm Int 2.92  3.3  160 10   5.26 119 33.5   L ULNAR - FDI      Wrist FDI 2.71  16.1  100    5.68 100 32.9      B.Elbow FDI 6.20  12.7  79.2 21 60.2  5.42 79.1 32.9      A.Elbow FDI 7.71  12.4  77.3 9 59.6  5.47 77.7 32.9   R ULNAR - FDI      Wrist FDI 2.66  13.5  100    5.78 100 33.4      B.Elbow FDI 6.04  13.3  98.6 21 62.0  5.99 103 33.4      A.Elbow FDI 7.45  12.1  89.9 9 64.0  6.30 103 33.5       EMG Summary Table     Spontaneous MUAP Recruitment    IA Fib PSW Fasc H.F. Amp Dur. PPP Pattern   L. PRON TERES N None None None None N N N N   L. FIRST D INTEROSS N None None None None N N N N   L. EXT DIG COMM N None None None None N N N N   L. BICEPS N None None None None N N N N   L. TRICEPS N None None None None N N N N   L. DELTOID N None None None None N N N N   L. C7 PSP N None None None None N N N N

## 2020-11-10 LAB
C3 SERPL-MCNC: 142 MG/DL (ref 81–157)
C4 SERPL-MCNC: 28 MG/DL (ref 13–39)

## 2020-11-11 LAB — CRP SERPL-MCNC: 10.3 MG/L (ref 0–8)

## 2020-11-12 LAB — DSDNA AB SER-ACNC: 5 IU/ML

## 2020-11-17 NOTE — PROGRESS NOTES
RN: Please call to notify Sugar Bonilla that the EMG / Nerve Conduction Study was normal  Omar Alejo MD  11/17/2020 8:05 AM

## 2020-11-23 ENCOUNTER — TELEPHONE (OUTPATIENT)
Dept: RHEUMATOLOGY | Facility: CLINIC | Age: 25
End: 2020-11-23

## 2020-11-23 NOTE — TELEPHONE ENCOUNTER
Wedge Bustert msg reviewed.  Closing this encounter.    Andres Washburn RN....11/23/2020 11:56 AM

## 2020-11-23 NOTE — TELEPHONE ENCOUNTER
Left message for patient to return call to clinic.  Also sent patient a mychart message.  Will close this encounter if mychart message is reviewed.    Andres Washburn RN....11/23/2020 10:27 AM

## 2020-12-02 ENCOUNTER — VIRTUAL VISIT (OUTPATIENT)
Dept: RHEUMATOLOGY | Facility: CLINIC | Age: 25
End: 2020-12-02
Payer: COMMERCIAL

## 2020-12-02 DIAGNOSIS — R20.0 NUMBNESS AND TINGLING IN BOTH HANDS: ICD-10-CM

## 2020-12-02 DIAGNOSIS — M18.12 PRIMARY OSTEOARTHRITIS OF FIRST CARPOMETACARPAL JOINT OF LEFT HAND: ICD-10-CM

## 2020-12-02 DIAGNOSIS — F17.200 TOBACCO USE DISORDER: ICD-10-CM

## 2020-12-02 DIAGNOSIS — R20.2 NUMBNESS AND TINGLING IN BOTH HANDS: ICD-10-CM

## 2020-12-02 DIAGNOSIS — Z79.899 HIGH RISK MEDICATION USE: ICD-10-CM

## 2020-12-02 DIAGNOSIS — M06.4 INFLAMMATORY POLYARTHROPATHY (H): Primary | ICD-10-CM

## 2020-12-02 PROCEDURE — 99214 OFFICE O/P EST MOD 30 MIN: CPT | Mod: GT | Performed by: INTERNAL MEDICINE

## 2020-12-02 RX ORDER — HYDROXYCHLOROQUINE SULFATE 200 MG/1
200 TABLET, FILM COATED ORAL 2 TIMES DAILY
Qty: 180 TABLET | Refills: 1 | Status: SHIPPED | OUTPATIENT
Start: 2020-12-02 | End: 2022-10-03

## 2020-12-02 RX ORDER — AZATHIOPRINE 50 MG/1
50 TABLET ORAL DAILY
Qty: 30 TABLET | Refills: 2 | Status: SHIPPED | OUTPATIENT
Start: 2020-12-02 | End: 2022-10-03

## 2020-12-02 NOTE — PROGRESS NOTES
"Sugar Bonilla is a 24 year old female who is being evaluated via a billable video visit.      The patient has been notified of following:     \"This video visit will be conducted via a call between you and your physician/provider. We have found that certain health care needs can be provided without the need for an in-person physical exam.  This service lets us provide the care you need with a video conversation.  If a prescription is necessary we can send it directly to your pharmacy.  If lab work is needed we can place an order for that and you can then stop by our lab to have the test done at a later time.    Video visits are billed at different rates depending on your insurance coverage.  Please reach out to your insurance provider with any questions.    If during the course of the call the physician/provider feels a video visit is not appropriate, you will not be charged for this service.\"    Patient has given verbal consent for Video visit? Yes  How would you like to obtain your AVS? MyChart  If you are dropped from the video visit, the video invite should be resent to: Text to cell phone: [phone]  Will anyone else be joining your video visit? No      Lis Alexander CMA Rheumatology  12/2/2020 10:12 AM    Rheumatology Video Visit      Sugar Bonilla MRN# [id number]   YOB: 1995 Age: 24 year old      Date of visit: 12/02/20   Referring provider: Dr. Wyatt Washington  PCP: Antonina Miranda at [facility]    Chief Complaint   Patient presents with:  RECHECK    Assessment and Plan     1. 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  Note that FRANCINE and histone antibodies may be found in non-drug induced lupus; and they could be present simply due to Humira use (used for hidradenitis suppurativa); DIL is suspected though.  No longer on Humira.  Currently on " hydroxychloroquine 200 mg twice daily with significant improvement over time and no longer requiring prednisone but mild MCP swelling currently.  Discussed additional treatment options and considering her plans for possible conception in the not too distant future, will start azathioprine.  Risks associated with azathioprine reviewed in detail.  - Continue hydroxychloroquine 200mg BID  - Start azathioprine 50mg daily  - Ophthalmology referral for hydroxychloroquine toxicity monitoring  - Labs in 2 weeks: CBC, creatinine, hepatic panel, TPMT, histone, UA  - Labs monthly [id number]: CBC, Cr, Hepatic Panel  - Labs in 3 months: CBC, CMP, ESR, CRP, CK, C3, C4, dsDNA, UA, Uprotein:creatinine    # Pregnancy planning: sexually active and not on birth control    # Azathioprine (Imuran) Risks and Benefits.  The risks and benefits of azathioprine were discussed in detail and the patient verbalized understanding.  The risks discussed include, but are not limited to, the risk for hypersensitivity, anaphylaxis, anaphylactoid reactions, the increased risk for malignancy, leukopenia, thrombocytopenia, anemia, hepatotoxicity (including increases in alkaline phosphatase, bilirubin, and transaminases), myalgia, infection, and fever.  People with genetic deficiency of TPMT are more sensitive to the myelosuppressive effects.  Myelosuppressive effects may occur more often if concurrently taking a xanthine oxidase inhibitor such as allopurinol.  I encouraged reviewing the package insert and asking any questions about the medication.     2. Hidradenitis suppurativa: following with dermatology. Was doing well on Humira; mild return of symptoms since stopping Humira but overall doing well does not want to change what she is doing she says.     2.  Numbness and tingling of both hands, especially at night: Not clearly the distribution for carpal tunnel syndrome.  NCS was normal.  Check x-ray of the cervical spine and depending on results we will  get an MRI of the cervical spine.  She reports no claustrophobia or metal in her body.   - X-ray: Cervical spine    3. Bilateral patellofemoral syndrome: Resolved with exercises.  Not an issue today.    4.  Impingement syndrome of both shoulders: Resolved with exercises.  Not an issue today.    5.  Left first CMC joint osteoarthritis: Mild.  Topical Voltaren gel as needed.    6. Cigarette Smoking:  Encouraged complete cessation and discussed the health benefits.      7.  Vaccinations: Vaccinations reviewed with Ms. Bonilla.  Risks and benefits of vaccinations were discussed.    - Influenza: encouraged yearly vaccination    # Relevant labs and imaging were reviewed with the patient    # High risk medication toxicity monitoring: discussion and labs reviewed; appropriate labs ordered. See above.  Instructed that if confirmed to have COVID-19 or exposure to someone with confirmed COVID-19 to call this clinic for directions on DMARD management.    # Note that this is a virtual visit to reduce the risk of COVID-19 exposure during this current pandemic.      # Considered to be at high risk of complications from the COVID-19 virus.  It is recommended to limit contact with other people and if possible to work remotely or provide a leave of absence to reduce the risk for COVID-19.      Ms. Bonilla verbalized agreement with and understanding of the rational for the diagnosis and treatment plan.  All questions were answered to best of my ability and the patient's satisfaction. Ms. Bonilla was advised to contact the clinic with any questions that may arise after the clinic visit.      Thank you for involving me in the care of the patient    Return to clinic: 3 months      HPI   Sugar Bonilla is a 24 year old female with a past medical history significant for hidradenitis suppurativa who is seen for follow-up of arthritis..    Today, 12/2/2020: she reports having issues with pain that she says is at the right 4th MCP  and left 1st CMC joint.  The left 1st CMC joint hurts with opening jars and with use.  Right 4th MCP joint aches all the time; no change with time of day or activity.  Morning stiffness until about noon, and then again after 3PM. Hidradenitis suppurativa flared on the thighs and abdominal area; has a topical cream from dermatology she says; and tea tree oil.  Knee pain has resolved; better with weekly chiropractor treatment.  Muscle spasms involving the legs, mostly below the knee and occurring at night; she is focusing on remaining hydrated.  Shoulder pain resolved with exercises and chiropractic treatment.  Try to quit smoking.  Numbness and tingling in the hands at night has remained unchanged.    Denies fevers, chills, nausea, vomiting, constipation, diarrhea. No abdominal pain. No chest pain/pressure, palpitations, or shortness of breath. No LE swelling. No neck pain. No oral or nasal sores.  No sicca symptoms. No photosensitivity or photophobia. No eye pain or redness. No history of inflammatory eye disease.  No history of inflammatory bowel disease. No history of DVT, pulmonary embolism, or miscarriage.   No history of serositis.  No history of Raynaud's Phenomenon.  No known seizure disorder.  No known renal disorder.      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     ROS   GEN: No fevers, chills, night sweats, or weight change  SKIN: See HPI  HEENT:No oral or nasal ulcers.  CV: No chest pain, pressure, palpitations, or dyspnea on exertion; but note the left rib cage pain noted in the HPI.  PULM: No SOB, wheeze, cough.  GI: No nausea, vomiting, constipation, diarrhea. No blood in stool. No abdominal pain.  : No blood in urine.  MSK: See HPI.  NEURO: See HPI  ENDO: No heat/cold intolerance.  EXT: No LE swelling  PSYCH: Negative    Active Problem List   There  is no problem list on file for this patient.    Past Medical History   History reviewed. No pertinent past medical history.  Past Surgical History   History reviewed. No pertinent surgical history.  Allergy     Allergies   Allergen Reactions     Cefaclor Nausea     Current Medication List     Current Outpatient Medications   Medication Sig     aluminum chloride (DRYSOL) 20 % external solution Apply topically At Bedtime     FLUoxetine (PROZAC) 40 MG capsule Take 40 mg by mouth daily     fluticasone (FLONASE) 50 MCG/ACT nasal spray Spray 2 sprays into both nostrils daily     hydroxychloroquine (PLAQUENIL) 200 MG tablet Take 1 tablet (200 mg) by mouth 2 times daily     omeprazole (PRILOSEC) 20 MG DR capsule Take 20 mg by mouth daily     adalimumab (HUMIRA *CF*) 80 MG/0.8ML & 40MG/0.4ML pen kit Inject 40 mg subcutaneous once weekly. (Patient not taking: Reported on 7/28/2020)     adalimumab (HUMIRA) 40 MG/0.8ML prefilled syringe kit 40 mg injections on Day 1, 80 mg two weeks later (Day 15). Begin 40 mg weekly dosing two weeks later (Day 29). (Patient not taking: Reported on 7/28/2020)     baclofen (LIORESAL) 10 MG tablet Take 10 mg by mouth daily     chlorhexidine (HIBICLENS) 4 % liquid Wash axilla, groin daily do not put on face (Patient not taking: Reported on 8/3/2020)     chlorhexidine (HIBICLENS) 4 % liquid Apply topically daily as needed for wound care Wash axilla, groin daily DO NOT PUT ON FACE (Patient not taking: Reported on 1/22/2020)     clindamycin-benzoyl peroxide (BENZACLIN) 1-5 % external gel Apply topically 2 times daily (Patient not taking: Reported on 8/3/2020)     clindamycin-benzoyl peroxide (BENZACLIN) 1-5 % external gel Apply topically 2 times daily To axilla and groin (Patient not taking: Reported on 1/22/2020)     doxycycline monohydrate (MONODOX) 100 MG capsule Take 1 capsule (100 mg) by mouth 2 times daily (Patient not taking: Reported on 8/3/2020)     doxycycline monohydrate (MONODOX) 100 MG  capsule Take 1 capsule (100 mg) by mouth 2 times daily (Patient not taking: Reported on 1/22/2020)     FLUoxetine (PROZAC) 20 MG capsule Take 20 mg by mouth     hydrOXYzine (ATARAX) 25 MG tablet Take 25 mg by mouth daily     oxyCODONE (OXY-IR) 5 MG capsule Take 5 mg by mouth every 4 hours as needed for severe pain     oxyCODONE-acetaminophen (PERCOCET)  MG per tablet      predniSONE (DELTASONE) 5 MG tablet Prednisone 10mg daily for 2weeks, then 5mg daily thereafter. (Patient not taking: Reported on 12/2/2020)     sulfamethoxazole-trimethoprim (BACTRIM DS) 800-160 MG tablet      SUMAtriptan (IMITREX) 50 MG tablet Take 50 mg by mouth as needed      traZODone (DESYREL) 100 MG tablet Take 100 mg by mouth     No current facility-administered medications for this visit.          Social History   See HPI    Family History       Younger brother: Hashimoto's        Physical Exam     Temp Readings from Last 3 Encounters:   09/29/20 98.2  F (36.8  C) (Oral)     BP Readings from Last 5 Encounters:   09/29/20 136/74   02/19/20 120/69   01/22/20 112/70   11/13/19 112/67   08/13/19 108/73     Pulse Readings from Last 1 Encounters:   09/29/20 95     GEN: NAD. Obese  HEENT: MMM.  Anicteric, noninjected sclera. No obvious external lesions of the ear and nose. Hearing intact.  PULM: No increased work of breathing  MSK: Mild swelling of the left fourth MCP; no overlying erythema.  Other MCPs without swelling.  PIPs without swelling.  DIPs and wrists without swelling.    SKIN: No rash or jaundice seen.   PSYCH: Alert. Appropriate.      Labs / Imaging (select studies)     RF/CCP  Recent Labs   Lab Test 08/05/20  1134   CCPIGG 2   RHF <7     FRANCINE  Recent Labs   Lab Test 08/05/20  1134 07/01/20  1708   GRAY Positive* Positive*   ANAP1 HOMOGENEOUS HOMOGENEOUS   ANAT1 1:640 1:1,280     RNP/Sm/SSA/SSB  Recent Labs   Lab Test 08/05/20  1134   RNPIGG <0.2   SMIGG <0.2   SSAIGG <0.2   SSBIGG <0.2     dsDNA  Recent Labs   Lab Test  11/09/20  1258 08/05/20  1134 07/01/20  1708   DNA 5 3 4     C3/C4  Recent Labs   Lab Test 11/09/20  1258 08/05/20  1134   F5BCDZE 142 165*   K8SARYC 28 24     Histone Antibody  Recent Labs   Lab Test 08/05/20  1134 07/01/20  1708   HSTO 2.1* 2.1*     Antiphospholipid Antibodies  Recent Labs   Lab Test 08/05/20  1134   B2GPG 1.0   B2GPM <2.9   CARDG <1.6   CARDM 0.8   LUPINT Negative     CBC  Recent Labs   Lab Test 11/09/20  1258 08/05/20  1134 01/22/20  1355   WBC 7.8 7.6 11.6*   RBC 4.99 4.73 4.87   HGB 14.3 14.3 14.9   HCT 42.1 41.6 45.5   MCV 84 88 93   RDW 12.5 12.2 12.6    406 313   MCH 28.7 30.2 30.6   MCHC 34.0 34.4 32.7   NEUTROPHIL 58.0 71.3  --    LYMPH 34.6 24.1  --    MONOCYTE 4.6 3.0  --    EOSINOPHIL 2.1 1.5  --    BASOPHIL 0.3 0.1  --    ANEU 4.5 5.4  --    ALYM 2.7 1.8  --    SKYLAR 0.4 0.2  --    AEOS 0.2 0.1  --    ABAS 0.0 0.0  --      CMP  Recent Labs   Lab Test 11/09/20  1258 08/05/20  1134 07/01/20  1708 01/22/20  1355    133 133 140   POTASSIUM 3.8 3.6 3.7 3.6   CHLORIDE 108 103 104 108   CO2 27 25 23 24   ANIONGAP 4 5 6 8   GLC 93 116* 115* 78   BUN 19 10 12 15   CR 0.82 0.92 1.10* 0.97   GFRESTIMATED >90 86 69 81   GFRESTBLACK >90 >90 80 >90   BK 8.9 8.9 9.1 9.4   BILITOTAL 0.4 0.5 0.5 0.5   ALBUMIN 3.7 4.0 3.9 3.9   PROTTOTAL 7.6 8.4 8.1 7.7   ALKPHOS 79 89 68 73   AST 20 35 31 17   ALT 21 68* 36 33     Calcium/VitaminD  Recent Labs   Lab Test 11/09/20  1258 08/05/20  1134 07/01/20  1708   BK 8.9 8.9 9.1     ESR/CRP  Recent Labs   Lab Test 11/09/20  1258 08/05/20  1134   SED 13 16   CRP 10.3* 16.9*     CK/Aldolase  Recent Labs   Lab Test 11/09/20  1258 08/05/20  1134    93     Hepatitis B  Recent Labs   Lab Test 08/05/20  1134   HBCAB Nonreactive   HEPBANG Nonreactive     Hepatitis C  Recent Labs   Lab Test 11/13/19  1139   HCVAB Nonreactive     Lyme ab screening  Recent Labs   Lab Test 08/05/20  1134   LYMEGM 0.37     Tuberculosis Screening  Recent Labs   Lab Test  11/13/19  1139   TBRES Negative     HIV Screening  Recent Labs   Lab Test 08/05/20  1134   HIAGAB Nonreactive     UA  Recent Labs   Lab Test 08/05/20  1135   COLOR Yellow   APPEARANCE Clear   URINEGLC Negative   URINEBILI Negative   SG 1.010   URINEPH 7.0   PROTEIN Negative   UROBILINOGEN 0.2   NITRITE Negative   UBLD Negative   LEUKEST Negative     Urine Protein  Recent Labs   Lab Test 08/05/20  1135   UTP <0.05   UTPG Unable to calculate due to low value   UCRR 30     FRANCINE panel per patient from [facility] 0.2 (0.0-0.9)  SSA negative  SSB negative  RNP negative  Sm negative  Scl-70 negative  dsDNA negative  Farrah-1 negative  Centromere negative    Immunization History     There is no immunization history on file for this patient.       Chart documentation done in part with Dragon Voice recognition Software. Although reviewed after completion, some word and grammatical error may remain.      Video-Visit Details    Type of service:  Video Visit    Video Start Time: 10:40 AM  Video End Time: 10:55 AM    Originating Location (pt. Location): [place], [place]    Distant Location (provider location):  Home    Platform used for Video Visit: Stiven Alejo MD

## 2020-12-18 ENCOUNTER — ANCILLARY PROCEDURE (OUTPATIENT)
Dept: GENERAL RADIOLOGY | Facility: CLINIC | Age: 25
End: 2020-12-18
Attending: INTERNAL MEDICINE
Payer: COMMERCIAL

## 2020-12-18 DIAGNOSIS — T50.905A DRUG-INDUCED LUPUS ERYTHEMATOSUS: ICD-10-CM

## 2020-12-18 DIAGNOSIS — R20.2 NUMBNESS AND TINGLING IN BOTH HANDS: ICD-10-CM

## 2020-12-18 DIAGNOSIS — M06.4 INFLAMMATORY POLYARTHROPATHY (H): ICD-10-CM

## 2020-12-18 DIAGNOSIS — L93.2 DRUG-INDUCED LUPUS ERYTHEMATOSUS: ICD-10-CM

## 2020-12-18 DIAGNOSIS — R20.0 NUMBNESS AND TINGLING IN BOTH HANDS: ICD-10-CM

## 2020-12-18 LAB
ALBUMIN SERPL-MCNC: 3.8 G/DL (ref 3.4–5)
ALBUMIN UR-MCNC: NEGATIVE MG/DL
ALP SERPL-CCNC: 73 U/L (ref 40–150)
ALT SERPL W P-5'-P-CCNC: 19 U/L (ref 0–50)
APPEARANCE UR: CLEAR
AST SERPL W P-5'-P-CCNC: 14 U/L (ref 0–45)
BASOPHILS # BLD AUTO: 0 10E9/L (ref 0–0.2)
BASOPHILS NFR BLD AUTO: 0.1 %
BILIRUB DIRECT SERPL-MCNC: <0.1 MG/DL (ref 0–0.2)
BILIRUB SERPL-MCNC: 0.4 MG/DL (ref 0.2–1.3)
BILIRUB UR QL STRIP: NEGATIVE
COLOR UR AUTO: YELLOW
CREAT SERPL-MCNC: 0.86 MG/DL (ref 0.52–1.04)
CREAT UR-MCNC: 38 MG/DL
DIFFERENTIAL METHOD BLD: NORMAL
EOSINOPHIL # BLD AUTO: 0.2 10E9/L (ref 0–0.7)
EOSINOPHIL NFR BLD AUTO: 2.3 %
ERYTHROCYTE [DISTWIDTH] IN BLOOD BY AUTOMATED COUNT: 13 % (ref 10–15)
GFR SERPL CREATININE-BSD FRML MDRD: >90 ML/MIN/{1.73_M2}
GLUCOSE UR STRIP-MCNC: NEGATIVE MG/DL
HCT VFR BLD AUTO: 41.1 % (ref 35–47)
HGB BLD-MCNC: 14.3 G/DL (ref 11.7–15.7)
HGB UR QL STRIP: NEGATIVE
KETONES UR STRIP-MCNC: NEGATIVE MG/DL
LEUKOCYTE ESTERASE UR QL STRIP: NEGATIVE
LYMPHOCYTES # BLD AUTO: 2.3 10E9/L (ref 0.8–5.3)
LYMPHOCYTES NFR BLD AUTO: 33.4 %
MCH RBC QN AUTO: 29.2 PG (ref 26.5–33)
MCHC RBC AUTO-ENTMCNC: 34.8 G/DL (ref 31.5–36.5)
MCV RBC AUTO: 84 FL (ref 78–100)
MONOCYTES # BLD AUTO: 0.4 10E9/L (ref 0–1.3)
MONOCYTES NFR BLD AUTO: 6.3 %
NEUTROPHILS # BLD AUTO: 4 10E9/L (ref 1.6–8.3)
NEUTROPHILS NFR BLD AUTO: 57.9 %
NITRATE UR QL: NEGATIVE
PH UR STRIP: 7 PH (ref 5–7)
PLATELET # BLD AUTO: 349 10E9/L (ref 150–450)
PROT SERPL-MCNC: 7.8 G/DL (ref 6.8–8.8)
PROT UR-MCNC: <0.05 G/L
PROT/CREAT 24H UR: NORMAL G/G CR (ref 0–0.2)
RBC # BLD AUTO: 4.9 10E12/L (ref 3.8–5.2)
SOURCE: NORMAL
SP GR UR STRIP: 1.01 (ref 1–1.03)
UROBILINOGEN UR STRIP-ACNC: 0.2 EU/DL (ref 0.2–1)
WBC # BLD AUTO: 6.9 10E9/L (ref 4–11)

## 2020-12-18 PROCEDURE — 85025 COMPLETE CBC W/AUTO DIFF WBC: CPT | Performed by: INTERNAL MEDICINE

## 2020-12-18 PROCEDURE — 82657 ENZYME CELL ACTIVITY: CPT | Mod: 90 | Performed by: INTERNAL MEDICINE

## 2020-12-18 PROCEDURE — 72040 X-RAY EXAM NECK SPINE 2-3 VW: CPT | Performed by: RADIOLOGY

## 2020-12-18 PROCEDURE — 84156 ASSAY OF PROTEIN URINE: CPT | Performed by: INTERNAL MEDICINE

## 2020-12-18 PROCEDURE — 80076 HEPATIC FUNCTION PANEL: CPT | Performed by: INTERNAL MEDICINE

## 2020-12-18 PROCEDURE — 99000 SPECIMEN HANDLING OFFICE-LAB: CPT | Performed by: INTERNAL MEDICINE

## 2020-12-18 PROCEDURE — 83516 IMMUNOASSAY NONANTIBODY: CPT | Mod: 90 | Performed by: INTERNAL MEDICINE

## 2020-12-18 PROCEDURE — 81003 URINALYSIS AUTO W/O SCOPE: CPT | Performed by: INTERNAL MEDICINE

## 2020-12-18 PROCEDURE — 36415 COLL VENOUS BLD VENIPUNCTURE: CPT | Performed by: INTERNAL MEDICINE

## 2020-12-18 PROCEDURE — 82565 ASSAY OF CREATININE: CPT | Performed by: INTERNAL MEDICINE

## 2020-12-22 LAB
HISTONE IGG SER IA-ACNC: 0.9 UNITS (ref 0–0.9)
TPMT BLD-CCNC: 27 U/ML (ref 24–44)

## 2020-12-27 ENCOUNTER — HEALTH MAINTENANCE LETTER (OUTPATIENT)
Age: 25
End: 2020-12-27

## 2021-01-26 ENCOUNTER — PRE VISIT (OUTPATIENT)
Dept: NEUROLOGY | Facility: CLINIC | Age: 26
End: 2021-01-26

## 2021-01-26 NOTE — TELEPHONE ENCOUNTER
FUTURE VISIT INFORMATION:    Date: 2/2/21    Time:  1420    Location: [facility]   REFERRAL INFORMATION:    Referring provider:  Omar Alejo MD    Referring providers clinic: [facility]    Reason for visit/diagnosis:  Paresthesias BUE, Headache       NOTES (FOR ALL VISITS) STATUS DETAILS   OFFICE NOTE from referring provider Printed 9/29/20, 10/20/20, 12/2/20   OFFICE NOTE from other specialist Printed     DISCHARGE SUMMARY from hospital       DISCHARGE REPORT from the ER Printed [facility] 1/3/15  [facility] 1/7/16, 12/30/18   MEDICATION LIST In Epic     IMAGING  (FOR ALL VISITS)       EMG  Printed  11/9/20   EEG       MRI (HEAD, NECK, SPINE) Printed  Images in PACs CT head 2/20/15   CARDIAC STUDIES        FORMAL COGNITIVE TESTING       OTHER

## 2021-02-02 ENCOUNTER — OFFICE VISIT (OUTPATIENT)
Dept: NEUROLOGY | Facility: CLINIC | Age: 26
End: 2021-02-02
Attending: INTERNAL MEDICINE
Payer: COMMERCIAL

## 2021-02-02 VITALS
DIASTOLIC BLOOD PRESSURE: 67 MMHG | HEART RATE: 94 BPM | RESPIRATION RATE: 12 BRPM | TEMPERATURE: 97.6 F | SYSTOLIC BLOOD PRESSURE: 111 MMHG

## 2021-02-02 DIAGNOSIS — G43.009 MIGRAINE WITHOUT AURA AND WITHOUT STATUS MIGRAINOSUS, NOT INTRACTABLE: ICD-10-CM

## 2021-02-02 DIAGNOSIS — G44.86 CERVICOGENIC HEADACHE: Primary | ICD-10-CM

## 2021-02-02 PROCEDURE — 2894A VOIDCORRECT: CPT | Performed by: PSYCHIATRY & NEUROLOGY

## 2021-02-02 PROCEDURE — 99215 OFFICE O/P EST HI 40 MIN: CPT | Performed by: PSYCHIATRY & NEUROLOGY

## 2021-02-02 RX ORDER — RIZATRIPTAN BENZOATE 5 MG/1
5 TABLET ORAL
Qty: 18 TABLET | Refills: 2 | Status: SHIPPED | OUTPATIENT
Start: 2021-02-02 | End: 2023-02-22

## 2021-02-02 ASSESSMENT — PAIN SCALES - GENERAL: PAINLEVEL: MODERATE PAIN (5)

## 2021-02-02 NOTE — LETTER
2/2/2021         RE: Sugar Bonilla  [address]  [place] [address]        Dear Colleague,    Thank you for referring your patient, Sugar Bonilla, to the [facility] [place]. Please see a copy of my visit note below.    INITIAL NEUROLOGY CONSULTATION    DATE OF VISIT: 2/2/2021  MRN: [id number]  PATIENT NAME: Sugar Bonilla  YOB: 1995    REFERRING PROVIDER: Omar Alejo    Chief Complaint   Patient presents with     New Patient     Paresthesias BUE, Headache.  Referral by Omar Zeng MD  Daily headaches-- wakes with headache. Neck pain and stiffness.        SUBJECTIVE:                                                      HPI:   Sugar Bonilla is a 25 year old female whom I have been asked by Dr. Alejo to see in consultation for headaches. Per chart review, the patient has history of migraines dating back to at least 5 years ago.  When she was seen in an outside emergency room in 2016, that note indicated that she was on Topamax and Imitrex for her headaches.  She was seen again for another migraine in 2018.  At that time she reported menstrual migraines, that responded to Tylenol and ibuprofen.  She had run out of her Imitrex a month before that encounter.  She was seen in rheumatology clinic about 3 months ago for follow-up regarding inflammatory polyarthropathy, drug-induced lupus.  She complained of some numbness and tingling in the hands, so an EMG was ordered.  Nerve conduction studies were reportedly normal.  Dr. Alejo also ordered spinal imaging, which was unremarkable.    She says that the migraines were under control for several years until the recent issue with the drug-induced lupus coming into play.     The headaches have changed. She says she used to have visual changes with her pain, and some nausea.  Now she is really only sensitive to smells.  No photophobia or phonophobia. The headache involves the crown of her head  mainly, although she does also have some neck pain and stiffness with this as well.  Does not necessarily radiate up from her occiput. She says she wakes up with sharp headaches most days. Usually water helps and it gets milder as the day progresses.     She says she has been on other medications for migraine prevention in the past as well: Effexor, nortiptyline are names that she remembers.  She thinks the Topamax was the most effective in decreasing her headache frequency and severity.    She says the Imitrex used to be helpful, but it is less-so now. She does not think she has tried any other triptans.  She was taking Tylenol, ibuprofen and Aleve multiple times daily, but she has decreased this to just 1 or 2 days/week.    She had been planning to try and get pregnant, but says she has stepped back from that for now.  She mentions that she is terrible at taking medications, but she is getting better.    She has numbness and pain around the wrists and in the right hand almost all the time.  Today she has a lot of neck stiffness. She has seen a chiropractor for this. It has been about a year since her last eye exam. No visual changes.     No past medical history on file.  No past surgical history on file.         aluminum chloride (DRYSOL) 20 % external solution, Apply topically At Bedtime       clindamycin-benzoyl peroxide (BENZACLIN) 1-5 % external gel, Apply topically 2 times daily To axilla and groin (Patient taking differently: Apply topically 2 times daily To axilla and groin, using as needed)       FLUoxetine (PROZAC) 40 MG capsule, Take 40 mg by mouth daily       fluticasone (FLONASE) 50 MCG/ACT nasal spray, Spray 2 sprays into both nostrils daily       hydroxychloroquine (PLAQUENIL) 200 MG tablet, Take 1 tablet (200 mg) by mouth 2 times daily       omeprazole (PRILOSEC) 20 MG DR capsule, Take 20 mg by mouth daily       SUMAtriptan (IMITREX) 50 MG tablet, Take 50 mg by mouth as needed        adalimumab  (HUMIRA *CF*) 80 MG/0.8ML & 40MG/0.4ML pen kit, Inject 40 mg subcutaneous once weekly. (Patient not taking: Reported on 7/28/2020)       adalimumab (HUMIRA) 40 MG/0.8ML prefilled syringe kit, 40 mg injections on Day 1, 80 mg two weeks later (Day 15). Begin 40 mg weekly dosing two weeks later (Day 29). (Patient not taking: Reported on 7/28/2020)       azaTHIOprine (IMURAN) 50 MG tablet, Take 1 tablet (50 mg) by mouth daily (Patient not taking: Reported on 2/2/2021)       baclofen (LIORESAL) 10 MG tablet, Take 10 mg by mouth daily       chlorhexidine (HIBICLENS) 4 % liquid, Wash axilla, groin daily do not put on face (Patient not taking: Reported on 8/3/2020)       chlorhexidine (HIBICLENS) 4 % liquid, Apply topically daily as needed for wound care Wash axilla, groin daily DO NOT PUT ON FACE (Patient not taking: Reported on 1/22/2020)       clindamycin-benzoyl peroxide (BENZACLIN) 1-5 % external gel, Apply topically 2 times daily (Patient not taking: Reported on 8/3/2020)       doxycycline monohydrate (MONODOX) 100 MG capsule, Take 1 capsule (100 mg) by mouth 2 times daily (Patient not taking: Reported on 8/3/2020)       doxycycline monohydrate (MONODOX) 100 MG capsule, Take 1 capsule (100 mg) by mouth 2 times daily (Patient not taking: Reported on 1/22/2020)       FLUoxetine (PROZAC) 20 MG capsule, Take 20 mg by mouth       hydrOXYzine (ATARAX) 25 MG tablet, Take 25 mg by mouth daily       oxyCODONE (OXY-IR) 5 MG capsule, Take 5 mg by mouth every 4 hours as needed for severe pain       oxyCODONE-acetaminophen (PERCOCET)  MG per tablet,        predniSONE (DELTASONE) 5 MG tablet, Prednisone 10mg daily for 2weeks, then 5mg daily thereafter. (Patient not taking: Reported on 12/2/2020)       sulfamethoxazole-trimethoprim (BACTRIM DS) 800-160 MG tablet,        traZODone (DESYREL) 100 MG tablet, Take 100 mg by mouth    No current facility-administered medications on file prior to visit.     Allergies   Allergen  Reactions     Cefaclor Nausea        Problem (# of Occurrences) Relation (Name,Age of Onset)    Brain Cancer (1) Maternal Uncle    Other - See Comments (1) Brother: Hashimoto's encepholopathy        Social History     Tobacco Use     Smoking status: Current Every Day Smoker     Packs/day: 0.50     Types: Cigarettes     Smokeless tobacco: Never Used   Substance Use Topics     Alcohol use: None     Drug use: None       REVIEW OF SYSTEMS:                                                      10-point review of systems is negative except as mentioned above in HPI.     EXAM:                                                      Physical Exam:   Vitals: /67 (BP Location: Right arm, Patient Position: Sitting, Cuff Size: Adult Large)   Pulse 94   Temp 97.6  F (36.4  C) (Tympanic)   Resp 12   BMI= There is no height or weight on file to calculate BMI.  GENERAL: NAD.  HEENT: NC/AT.   CV: RRR. S1, S2.   NECK: No bruits.  Decreased range of motion around the neck.  Occiput and posterior neck are tender to palpation.  PULM: Non-labored breathing.   Neurologic:  MENTAL STATUS: Alert, attentive. Speech is fluent. Normal comprehension. Normal concentration. Adequate fund of knowledge.   CRANIAL NERVES: Discs flat. Visual fields intact to confrontation. Pupils equally, round and reactive to light. Facial sensation and movement normal. EOM full. Hearing intact to conversation. Sternocleidomastoids and trapezius strength intact. Palate moves symmetrically. Tongue midline.  MOTOR: 5/5 in proximal and distal muscle groups of upper and lower extremities. Tone and bulk normal.   DTRs: Intact and symmetric in biceps, BR, patellae.  Babinski down-going bilaterally.   SENSATION: Normal light touch and pinprick in the fingers and plantar surfaces of the feet. Intact proprioception at great toes. Vibration: Normal at both ankles.   COORDINATION: Normal finger nose finger. Finger tapping normal. Knee heel shin normal.  STATION AND GAIT:  Romberg negative. Casual gait is normal.     Relevant Data:  Cervical XR (12.18.20):  IMPRESSION: There is some mild gentle cervical kyphosis which could  just be positional. Posterior alignment is otherwise normal. Disc  spaces are preserved. Vertebral body heights are maintained. Soft  tissues are unremarkable.    Nerve conduction studies/EMG (11.9.20):  Interpretation:  This is a normal study. In particular, there is no electrophysiologic evidence of entrapment neuropathy or left cervical radiculopathy.    Imaging and EMG report reviewed by me independently.  Agree with results.    ASSESSMENT and PLAN:                                                      Assessment:     ICD-10-CM    1. Cervicogenic headache  R51.9 PAIN MANAGEMENT REFERRAL     MR Brain w/o & w Contrast     MR Cervical Spine w/o Contrast   2. Migraine without aura and without status migrainosus, not intractable  G43.009 rizatriptan (MAXALT) 5 MG tablet        Ms. Bonilla is a pleasant 25-year-old woman who was referred to neurology to discuss her headaches.  Most of her headaches sound more consistent with cervicogenic headache, versus tension headaches. One thing to note is that the headaches actually get better throughout the day.  She does report some smell sensitivity with some of her headaches which could be consistent with transformation of her migraines.  We will try a different triptan for her to have on hand for the migraine symptoms.  We discussed preventative headache options.  Given that she has some difficulties with taking medications on a regular basis, I suggested we start with trigger point injections.  I am hopeful that this will help with her neck stiffness as well.  We could consider retrial of the Topamax as long as she is not planning on getting pregnant anytime soon, as this was helpful for her in the past.  We will see how things go with the trigger point injections first.  I would like to get updated brain imaging, and  along with this we will go ahead and do an MRI of the cervical spine as well.  Given the normal nerve conduction studies I do not have an explanation for the sensory changes she describes in her hands.    Plan:  -- MRI Brain and Cervical Spine. We will notify you of the results.   -- Referral to Pain clinic for trigger point injections.   -- Switch from Imitrex to Maxalt (rizatriptan) as needed for migraine symptoms. *Information provided.     Total Time: 45 minutes were spent with the patient and in chart review/documentation. More than 50% of the time spent on counseling (as described above in Assessment and Plan/Instructions) /coordinating the care.    Yue Fields MD  Neurology    CC: Omar Alejo MD        Again, thank you for allowing me to participate in the care of your patient.        Sincerely,        Yue Fields MD

## 2021-02-02 NOTE — PROGRESS NOTES
INITIAL NEUROLOGY CONSULTATION    DATE OF VISIT: 2/2/2021  MRN: [id number]  PATIENT NAME: Sugar Bonilla  YOB: 1995    REFERRING PROVIDER: Omar Alejo    Chief Complaint   Patient presents with     New Patient     Paresthesias BUE, Headache.  Referral by Omar Zeng MD  Daily headaches-- wakes with headache. Neck pain and stiffness.        SUBJECTIVE:                                                      HPI:   Sugar Bonilla is a 25 year old female whom I have been asked by Dr. Alejo to see in consultation for headaches. Per chart review, the patient has history of migraines dating back to at least 5 years ago.  When she was seen in an outside emergency room in 2016, that note indicated that she was on Topamax and Imitrex for her headaches.  She was seen again for another migraine in 2018.  At that time she reported menstrual migraines, that responded to Tylenol and ibuprofen.  She had run out of her Imitrex a month before that encounter.  She was seen in rheumatology clinic about 3 months ago for follow-up regarding inflammatory polyarthropathy, drug-induced lupus.  She complained of some numbness and tingling in the hands, so an EMG was ordered.  Nerve conduction studies were reportedly normal.  Dr. Alejo also ordered spinal imaging, which was unremarkable.    She says that the migraines were under control for several years until the recent issue with the drug-induced lupus coming into play.     The headaches have changed. She says she used to have visual changes with her pain, and some nausea.  Now she is really only sensitive to smells.  No photophobia or phonophobia. The headache involves the crown of her head mainly, although she does also have some neck pain and stiffness with this as well.  Does not necessarily radiate up from her occiput. She says she wakes up with sharp headaches most days. Usually water helps and it gets milder as the day progresses.     She says she has  been on other medications for migraine prevention in the past as well: Effexor, nortiptyline are names that she remembers.  She thinks the Topamax was the most effective in decreasing her headache frequency and severity.    She says the Imitrex used to be helpful, but it is less-so now. She does not think she has tried any other triptans.  She was taking Tylenol, ibuprofen and Aleve multiple times daily, but she has decreased this to just 1 or 2 days/week.    She had been planning to try and get pregnant, but says she has stepped back from that for now.  She mentions that she is terrible at taking medications, but she is getting better.    She has numbness and pain around the wrists and in the right hand almost all the time.  Today she has a lot of neck stiffness. She has seen a chiropractor for this. It has been about a year since her last eye exam. No visual changes.     No past medical history on file.  No past surgical history on file.         aluminum chloride (DRYSOL) 20 % external solution, Apply topically At Bedtime       clindamycin-benzoyl peroxide (BENZACLIN) 1-5 % external gel, Apply topically 2 times daily To axilla and groin (Patient taking differently: Apply topically 2 times daily To axilla and groin, using as needed)       FLUoxetine (PROZAC) 40 MG capsule, Take 40 mg by mouth daily       fluticasone (FLONASE) 50 MCG/ACT nasal spray, Spray 2 sprays into both nostrils daily       hydroxychloroquine (PLAQUENIL) 200 MG tablet, Take 1 tablet (200 mg) by mouth 2 times daily       omeprazole (PRILOSEC) 20 MG DR capsule, Take 20 mg by mouth daily       SUMAtriptan (IMITREX) 50 MG tablet, Take 50 mg by mouth as needed        adalimumab (HUMIRA *CF*) 80 MG/0.8ML & 40MG/0.4ML pen kit, Inject 40 mg subcutaneous once weekly. (Patient not taking: Reported on 7/28/2020)       adalimumab (HUMIRA) 40 MG/0.8ML prefilled syringe kit, 40 mg injections on Day 1, 80 mg two weeks later (Day 15). Begin 40 mg weekly dosing  two weeks later (Day 29). (Patient not taking: Reported on 7/28/2020)       azaTHIOprine (IMURAN) 50 MG tablet, Take 1 tablet (50 mg) by mouth daily (Patient not taking: Reported on 2/2/2021)       baclofen (LIORESAL) 10 MG tablet, Take 10 mg by mouth daily       chlorhexidine (HIBICLENS) 4 % liquid, Wash axilla, groin daily do not put on face (Patient not taking: Reported on 8/3/2020)       chlorhexidine (HIBICLENS) 4 % liquid, Apply topically daily as needed for wound care Wash axilla, groin daily DO NOT PUT ON FACE (Patient not taking: Reported on 1/22/2020)       clindamycin-benzoyl peroxide (BENZACLIN) 1-5 % external gel, Apply topically 2 times daily (Patient not taking: Reported on 8/3/2020)       doxycycline monohydrate (MONODOX) 100 MG capsule, Take 1 capsule (100 mg) by mouth 2 times daily (Patient not taking: Reported on 8/3/2020)       doxycycline monohydrate (MONODOX) 100 MG capsule, Take 1 capsule (100 mg) by mouth 2 times daily (Patient not taking: Reported on 1/22/2020)       FLUoxetine (PROZAC) 20 MG capsule, Take 20 mg by mouth       hydrOXYzine (ATARAX) 25 MG tablet, Take 25 mg by mouth daily       oxyCODONE (OXY-IR) 5 MG capsule, Take 5 mg by mouth every 4 hours as needed for severe pain       oxyCODONE-acetaminophen (PERCOCET)  MG per tablet,        predniSONE (DELTASONE) 5 MG tablet, Prednisone 10mg daily for 2weeks, then 5mg daily thereafter. (Patient not taking: Reported on 12/2/2020)       sulfamethoxazole-trimethoprim (BACTRIM DS) 800-160 MG tablet,        traZODone (DESYREL) 100 MG tablet, Take 100 mg by mouth    No current facility-administered medications on file prior to visit.     Allergies   Allergen Reactions     Cefaclor Nausea        Problem (# of Occurrences) Relation (Name,Age of Onset)    Brain Cancer (1) Maternal Uncle    Other - See Comments (1) Brother: Hashimoto's encepholopathy        Social History     Tobacco Use     Smoking status: Current Every Day Smoker      Packs/day: 0.50     Types: Cigarettes     Smokeless tobacco: Never Used   Substance Use Topics     Alcohol use: None     Drug use: None       REVIEW OF SYSTEMS:                                                      10-point review of systems is negative except as mentioned above in HPI.     EXAM:                                                      Physical Exam:   Vitals: /67 (BP Location: Right arm, Patient Position: Sitting, Cuff Size: Adult Large)   Pulse 94   Temp 97.6  F (36.4  C) (Tympanic)   Resp 12   BMI= There is no height or weight on file to calculate BMI.  GENERAL: NAD.  HEENT: NC/AT.   CV: RRR. S1, S2.   NECK: No bruits.  Decreased range of motion around the neck.  Occiput and posterior neck are tender to palpation.  PULM: Non-labored breathing.   Neurologic:  MENTAL STATUS: Alert, attentive. Speech is fluent. Normal comprehension. Normal concentration. Adequate fund of knowledge.   CRANIAL NERVES: Discs flat. Visual fields intact to confrontation. Pupils equally, round and reactive to light. Facial sensation and movement normal. EOM full. Hearing intact to conversation. Sternocleidomastoids and trapezius strength intact. Palate moves symmetrically. Tongue midline.  MOTOR: 5/5 in proximal and distal muscle groups of upper and lower extremities. Tone and bulk normal.   DTRs: Intact and symmetric in biceps, BR, patellae.  Babinski down-going bilaterally.   SENSATION: Normal light touch and pinprick in the fingers and plantar surfaces of the feet. Intact proprioception at great toes. Vibration: Normal at both ankles.   COORDINATION: Normal finger nose finger. Finger tapping normal. Knee heel shin normal.  STATION AND GAIT: Romberg negative. Casual gait is normal.     Relevant Data:  Cervical XR (12.18.20):  IMPRESSION: There is some mild gentle cervical kyphosis which could  just be positional. Posterior alignment is otherwise normal. Disc  spaces are preserved. Vertebral body heights are  maintained. Soft  tissues are unremarkable.    Nerve conduction studies/EMG (11.9.20):  Interpretation:  This is a normal study. In particular, there is no electrophysiologic evidence of entrapment neuropathy or left cervical radiculopathy.    Imaging and EMG report reviewed by me independently.  Agree with results.    ASSESSMENT and PLAN:                                                      Assessment:     ICD-10-CM    1. Cervicogenic headache  R51.9 PAIN MANAGEMENT REFERRAL     MR Brain w/o & w Contrast     MR Cervical Spine w/o Contrast   2. Migraine without aura and without status migrainosus, not intractable  G43.009 rizatriptan (MAXALT) 5 MG tablet        Ms. Bonilla is a pleasant 25-year-old woman who was referred to neurology to discuss her headaches.  Most of her headaches sound more consistent with cervicogenic headache, versus tension headaches. One thing to note is that the headaches actually get better throughout the day.  She does report some smell sensitivity with some of her headaches which could be consistent with transformation of her migraines.  We will try a different triptan for her to have on hand for the migraine symptoms.  We discussed preventative headache options.  Given that she has some difficulties with taking medications on a regular basis, I suggested we start with trigger point injections.  I am hopeful that this will help with her neck stiffness as well.  We could consider retrial of the Topamax as long as she is not planning on getting pregnant anytime soon, as this was helpful for her in the past.  We will see how things go with the trigger point injections first.  I would like to get updated brain imaging, and along with this we will go ahead and do an MRI of the cervical spine as well.  Given the normal nerve conduction studies I do not have an explanation for the sensory changes she describes in her hands.    Plan:  -- MRI Brain and Cervical Spine. We will notify you of the  results.   -- Referral to Pain clinic for trigger point injections.   -- Switch from Imitrex to Maxalt (rizatriptan) as needed for migraine symptoms. *Information provided.     Total Time: 45 minutes were spent with the patient and in chart review/documentation. More than 50% of the time spent on counseling (as described above in Assessment and Plan/Instructions) /coordinating the care.    Yue Fields MD  Neurology    CC: Omar Alejo MD

## 2021-02-02 NOTE — PATIENT INSTRUCTIONS
Plan:  -- MRI Brain and Cervical Spine. We will notify you of the results.   -- Referral to Pain clinic for trigger point injections.   -- Switch from Imitrex to Maxalt (rizatriptan) as needed for migraine symptoms. *Information provided.

## 2021-02-09 ENCOUNTER — HOSPITAL ENCOUNTER (OUTPATIENT)
Dept: MRI IMAGING | Facility: CLINIC | Age: 26
End: 2021-02-09
Attending: PSYCHIATRY & NEUROLOGY
Payer: COMMERCIAL

## 2021-02-09 DIAGNOSIS — G44.86 CERVICOGENIC HEADACHE: ICD-10-CM

## 2021-02-09 PROCEDURE — 72141 MRI NECK SPINE W/O DYE: CPT

## 2021-02-09 PROCEDURE — 70551 MRI BRAIN STEM W/O DYE: CPT

## 2021-02-10 ENCOUNTER — MYC MEDICAL ADVICE (OUTPATIENT)
Dept: NEUROLOGY | Facility: CLINIC | Age: 26
End: 2021-02-10

## 2021-02-10 NOTE — RESULT ENCOUNTER NOTE
Please advise Sugar Bonilla,  1995, that her  MRIs are normal; No significant findings to explain her headaches. She does have some congestion on the imaging, but I don't think this is contributing to her current headaches.  I see that she is scheduled in the pain clinic. I am hopeful the localized treatments are helpful.   [phone] (home)   Yue Fields MD

## 2021-02-15 ENCOUNTER — OFFICE VISIT (OUTPATIENT)
Dept: PALLIATIVE MEDICINE | Facility: CLINIC | Age: 26
End: 2021-02-15
Payer: COMMERCIAL

## 2021-02-15 VITALS — HEART RATE: 89 BPM | DIASTOLIC BLOOD PRESSURE: 76 MMHG | SYSTOLIC BLOOD PRESSURE: 112 MMHG

## 2021-02-15 DIAGNOSIS — L93.2 DRUG-INDUCED LUPUS ERYTHEMATOSUS: ICD-10-CM

## 2021-02-15 DIAGNOSIS — G44.86 CERVICOGENIC HEADACHE: ICD-10-CM

## 2021-02-15 DIAGNOSIS — L74.510 AXILLARY HYPERHIDROSIS: ICD-10-CM

## 2021-02-15 DIAGNOSIS — T50.905A DRUG-INDUCED LUPUS ERYTHEMATOSUS: ICD-10-CM

## 2021-02-15 PROCEDURE — 20553 NJX 1/MLT TRIGGER POINTS 3/>: CPT | Performed by: STUDENT IN AN ORGANIZED HEALTH CARE EDUCATION/TRAINING PROGRAM

## 2021-02-15 RX ORDER — BUPIVACAINE HYDROCHLORIDE 5 MG/ML
5 INJECTION, SOLUTION PERINEURAL ONCE
Status: COMPLETED | OUTPATIENT
Start: 2021-02-15 | End: 2021-02-15

## 2021-02-15 RX ORDER — LIDOCAINE HYDROCHLORIDE 10 MG/ML
5 INJECTION, SOLUTION INFILTRATION; PERINEURAL ONCE
Status: COMPLETED | OUTPATIENT
Start: 2021-02-15 | End: 2021-02-15

## 2021-02-15 RX ORDER — DOXYCYCLINE 100 MG/1
100 CAPSULE ORAL 2 TIMES DAILY
Qty: 60 CAPSULE | Refills: 0 | Status: SHIPPED | OUTPATIENT
Start: 2021-02-15 | End: 2021-04-20

## 2021-02-15 RX ADMIN — BUPIVACAINE HYDROCHLORIDE 25 MG: 5 INJECTION, SOLUTION PERINEURAL at 12:21

## 2021-02-15 RX ADMIN — LIDOCAINE HYDROCHLORIDE 5 ML: 10 INJECTION, SOLUTION INFILTRATION; PERINEURAL at 12:21

## 2021-02-15 ASSESSMENT — PAIN SCALES - GENERAL: PAINLEVEL: EXTREME PAIN (8)

## 2021-02-15 NOTE — TELEPHONE ENCOUNTER
"Patient was scheduled 1-26-21 to see Rheumatologist Dr. Alejo via video, but cancelled appointment with notes stating reason for cancel as \"I have to work\"    Rheumatology appointment is now rescheduled to 3-3-21.     Last seen in Dermatology on 7-28-20. Do you wish to refill until she can see Rheumatologist?    Fariba Fisher RN    "

## 2021-02-15 NOTE — PATIENT INSTRUCTIONS
[facility]   Post Procedure Instructions    Today you had:  trigger point injections   occipital nerve block   bursa injection      Medications used:  lidocaine   bupivicaine            Go to the emergency room if you develop any shortness of breath    Monitor the injection sites for signs and symptoms of infection-fever, chills, redness, swelling, warmth, or drainage to areas.    You may have soreness at injection sites for up to 24 hours.    You may apply ice to the painful areas to help minimize the discomfort of the needle pokes.    Do not apply heat to sites for at least 12 hours.    You may use anti-inflammatory medications or Tylenol for pain control if necessary    Pain Clinic phone number during work hours Monday-Friday:  [phone]    After hours provider line: [phone]

## 2021-02-15 NOTE — TELEPHONE ENCOUNTER
Requested Prescriptions   Pending Prescriptions Disp Refills     doxycycline monohydrate (MONODOX) 100 MG capsule 60 capsule 3     Sig: Take 1 capsule (100 mg) by mouth 2 times daily       There is no refill protocol information for this order      Last Written Prescription Date:    Last Fill Quantity: ,  # refills:    Last office visit: 2/19/2020 with prescribing provider:     Future Office Visit:

## 2021-02-15 NOTE — PROGRESS NOTES
Pre procedure Diagnosis: Myofascial pain   Post procedure Diagnosis: Same  Procedure performed: Trigger point injections  Anesthesia: none  Complications: none  Operators: Cher Whitlock MD     Indications:   Sugar Bonilla is a 25 year old female with a history of cervicalgia.  She was referred by Yue Luevano for trigger point injections. Exam shows myofascial pain of the muscle groups listed below and they have tried conservative treatment including medications.    Options/alternatives, benefits and risks were discussed with the patient including bleeding, infection, tissue trauma and pnuemothorax.  Questions were answered to her satisfaction and she agrees to proceed. Voluntary informed consent was obtained and signed.     Vitals were reviewed: Yes  Allergies were reviewed:  Yes   Medications were reviewed:  Yes   Pre-procedure pain score: 8/10    Procedure:  After getting informed consent, a Pause for the Cause was performed.    Trigger points were identified by patient, and marked when appropriate.  The area was prepped with Chloroprep.    Using a clean  technique, a 27G, 1.5 inch needle  was introduced into the target muscles.  After negative aspiration, injection was completed.  A total of 10 locations were injected.      Muscle groups injected:  Bilateral cervical paraspinal muscles  Bilateral upper trapezius muscles at the neck shoulder junction  Bilateral upper trapezius muscles   bilateral levator scapulae muscles at their insertion    Injection solution contained:  5ml of 1% lidocaine and 5ml of 0.5% bupivacaine.    Hemostasis was achieved, the area was cleaned, and bandaids were placed when appropriate.  The patient tolerated the procedure well.    Post-procedure pain score: 8/10  Follow-up includes:   -repeat prn    Cher Whitlock MD  [facility]

## 2021-02-18 ENCOUNTER — VIRTUAL VISIT (OUTPATIENT)
Dept: NEUROLOGY | Facility: CLINIC | Age: 26
End: 2021-02-18
Payer: COMMERCIAL

## 2021-02-18 DIAGNOSIS — G44.86 CERVICOGENIC HEADACHE: Primary | ICD-10-CM

## 2021-02-18 DIAGNOSIS — G43.009 MIGRAINE WITHOUT AURA AND WITHOUT STATUS MIGRAINOSUS, NOT INTRACTABLE: ICD-10-CM

## 2021-02-18 PROCEDURE — 99214 OFFICE O/P EST MOD 30 MIN: CPT | Mod: TEL | Performed by: PSYCHIATRY & NEUROLOGY

## 2021-02-18 RX ORDER — CETIRIZINE HYDROCHLORIDE 10 MG/1
10 TABLET ORAL DAILY
COMMUNITY
End: 2022-10-03

## 2021-02-18 RX ORDER — TOPIRAMATE 25 MG/1
TABLET, FILM COATED ORAL
Qty: 120 TABLET | Refills: 2 | Status: SHIPPED | OUTPATIENT
Start: 2021-02-18 | End: 2022-10-03

## 2021-02-18 NOTE — PROGRESS NOTES
Sugar is a 25 year old who is being evaluated via a billable telephone visit.      What phone number would you like to be contacted at? [phone]  How would you like to obtain your AVS? [facility] [place] [facility]    Assessment & Plan    Encounter Diagnoses   Name Primary?     Cervicogenic headache Yes     Migraine without aura and without status migrainosus, not intractable        Mr. Bonilla is a pleasant 25-year-old woman whom he met recently for consultation regarding cervicogenic headaches/tension headaches. She also has history of migraines.  There were some incidental findings on her cervical spine, which I do not think are related to her neck stiffness or pain.  We discussed this in detail over the phone today.  She does have some mastoid effusion, and endorses some congestion in the ears that seems to be ongoing.  She would like to see ENT for this which I think would be reasonable.  I do think it is unlikely that the mastoid finding is contributing to her headaches though.  Unfortunately she did not get much relief from the trigger point injections.  As we have discussed when we met in clinic, she had some success with her headaches on Topamax so she would like to try this again.  We will do a titrating dose and she will keep in touch with her response.  She is also interested in trying acupuncture for the neck stiffness and headaches.      I would like to see Sugar back in clinic in about 3 months.  She understands and agrees with the plan.    Patient Instructions:  -- Start Topamax: 25mg at bedtime for 1 week, then 25mg twice daily for 1 week, then 25mg in the morning and 50mg in the evening for 1 week and finally 50mg twice daily.  *Information provided.  -- Let us know how you are feeling after a week or two on the 50mg twice daily dosing.  We may have to adjust the dose further for effectiveness.  -- Referral for Acupuncture.  -- Continue the Maxalt as needed for migraines.   -- Return to Neurology in 3  months.    25 minutes spent on the date of the encounter doing chart review, history and exam, documentation and further activities as noted above    Tobacco Cessation:   reports that she has been smoking cigarettes. She has been smoking about 0.50 packs per day. She has never used smokeless tobacco.    No follow-ups on file.    Yue Fields MD  [facility] [place] Wooten is a 25 year old who presents for the following health issues: Headaches/neck pain.    HPI     Ms. Bonilla was recently seen in Neurology clinic for consultation regarding headaches and neck pain.    History as previously documented by me (2.2.21):  Sugar Bonilla is a 25 year old female whom I have been asked by Dr. Alejo to see in consultation for headaches. Per chart review, the patient has history of migraines dating back to at least 5 years ago.  When she was seen in an outside emergency room in 2016, that note indicated that she was on Topamax and Imitrex for her headaches.  She was seen again for another migraine in 2018.  At that time she reported menstrual migraines, that responded to Tylenol and ibuprofen.  She had run out of her Imitrex a month before that encounter.  She was seen in rheumatology clinic about 3 months ago for follow-up regarding inflammatory polyarthropathy, drug-induced lupus.  She complained of some numbness and tingling in the hands, so an EMG was ordered.  Nerve conduction studies were reportedly normal.  Dr. Alejo also ordered spinal imaging, which was unremarkable.     She says that the migraines were under control for several years until the recent issue with the drug-induced lupus coming into play.      The headaches have changed. She says she used to have visual changes with her pain, and some nausea.  Now she is really only sensitive to smells.  No photophobia or phonophobia. The headache involves the crown of her head mainly, although she does  also have some neck pain and stiffness with this as well.  Does not necessarily radiate up from her occiput. She says she wakes up with sharp headaches most days. Usually water helps and it gets milder as the day progresses.      She says she has been on other medications for migraine prevention in the past as well: Effexor, nortiptyline are names that she remembers.  She thinks the Topamax was the most effective in decreasing her headache frequency and severity.     She says the Imitrex used to be helpful, but it is less-so now. She does not think she has tried any other triptans.  She was taking Tylenol, ibuprofen and Aleve multiple times daily, but she has decreased this to just 1 or 2 days/week.     She had been planning to try and get pregnant, but says she has stepped back from that for now.  She mentions that she is terrible at taking medications, but she is getting better.     She has numbness and pain around the wrists and in the right hand almost all the time.  Today she has a lot of neck stiffness. She has seen a chiropractor for this. It has been about a year since her last eye exam. No visual changes.    We decided to get an MRI of her brain and cervical spine.  I also referred her to pain clinic for trigger point injections. She says that the trigger point injections helped for about half an hour afterwards, and then she felt like she was back to her baseline.  Brain parenchyma was normal on imaging.  The MRI did show some mastoid opacification.  There were couple of incidental findings on her cervical spine MRI as well, including a small likely perineural cyst and an aberrant right subclavian artery.  Also switched her Imitrex to Maxalt.    She mentions that she was told she has been told that she had some congestion in the areas behind her ears in the past as well.      Objective         Vitals:  No vitals were obtained today due to virtual visit.    Physical Exam   Alert, attentive.  Speech is  fluent.  Adequate fund of knowledge.    Relevant Data:  MRI Brain (2.9.21):  IMPRESSION:  1. Unremarkable MRI of the head.  2. Opacification of the mastoid cavities, likely  infectious/inflammatory.      MRI Cervical Spine (2.9.21):  IMPRESSION:    1. Slight reversal of the normal cervical lordosis.   2. No significant stenoses.  3. Probable small synovial cyst or perineural cyst projecting  laterally along the right C6-C7 facet joint, likely incidental.  4. Incidental aberrant right subclavian artery.    Imaging reviewed independently by me.  Agree with radiology read.    Phone call duration: 14 minutes    Dragon software used in the dictation of this note.

## 2021-02-18 NOTE — LETTER
2/18/2021         RE: Sugar Bonilla  [address]  [place] [address]        Dear Colleague,    Thank you for referring your patient, Sugar Bonilla, to the [facility] [place]. Please see a copy of my visit note below.    Sugar is a 25 year old who is being evaluated via a billable telephone visit.      What phone number would you like to be contacted at? [phone]  How would you like to obtain your AVS? MyChart    Assessment & Plan    Encounter Diagnoses   Name Primary?     Cervicogenic headache Yes     Migraine without aura and without status migrainosus, not intractable        Mr. Bonilla is a pleasant 25-year-old woman whom he met recently for consultation regarding cervicogenic headaches/tension headaches. She also has history of migraines.  There were some incidental findings on her cervical spine, which I do not think are related to her neck stiffness or pain.  We discussed this in detail over the phone today.  She does have some mastoid effusion, and endorses some congestion in the ears that seems to be ongoing.  She would like to see ENT for this which I think would be reasonable.  I do think it is unlikely that the mastoid finding is contributing to her headaches though.  Unfortunately she did not get much relief from the trigger point injections.  As we have discussed when we met in clinic, she had some success with her headaches on Topamax so she would like to try this again.  We will do a titrating dose and she will keep in touch with her response.  She is also interested in trying acupuncture for the neck stiffness and headaches.      I would like to see Sugar back in clinic in about 3 months.  She understands and agrees with the plan.    Patient Instructions:  -- Start Topamax: 25mg at bedtime for 1 week, then 25mg twice daily for 1 week, then 25mg in the morning and 50mg in the evening for 1 week and finally 50mg twice daily.  *Information  provided.  -- Let us know how you are feeling after a week or two on the 50mg twice daily dosing.  We may have to adjust the dose further for effectiveness.  -- Referral for Acupuncture.  -- Continue the Maxalt as needed for migraines.   -- Return to Neurology in 3 months.    25 minutes spent on the date of the encounter doing chart review, history and exam, documentation and further activities as noted above    Tobacco Cessation:   reports that she has been smoking cigarettes. She has been smoking about 0.50 packs per day. She has never used smokeless tobacco.    No follow-ups on file.    Yue Fields MD  [facility] [place] Wooten is a 25 year old who presents for the following health issues: Headaches/neck pain.    HPI     Ms. Bonilla was recently seen in Neurology clinic for consultation regarding headaches and neck pain.    History as previously documented by me (2.2.21):  Sugar Bonilla is a 25 year old female whom I have been asked by Dr. Alejo to see in consultation for headaches. Per chart review, the patient has history of migraines dating back to at least 5 years ago.  When she was seen in an outside emergency room in 2016, that note indicated that she was on Topamax and Imitrex for her headaches.  She was seen again for another migraine in 2018.  At that time she reported menstrual migraines, that responded to Tylenol and ibuprofen.  She had run out of her Imitrex a month before that encounter.  She was seen in rheumatology clinic about 3 months ago for follow-up regarding inflammatory polyarthropathy, drug-induced lupus.  She complained of some numbness and tingling in the hands, so an EMG was ordered.  Nerve conduction studies were reportedly normal.  Dr. Alejo also ordered spinal imaging, which was unremarkable.     She says that the migraines were under control for several years until the recent issue with the drug-induced lupus  coming into play.      The headaches have changed. She says she used to have visual changes with her pain, and some nausea.  Now she is really only sensitive to smells.  No photophobia or phonophobia. The headache involves the crown of her head mainly, although she does also have some neck pain and stiffness with this as well.  Does not necessarily radiate up from her occiput. She says she wakes up with sharp headaches most days. Usually water helps and it gets milder as the day progresses.      She says she has been on other medications for migraine prevention in the past as well: Effexor, nortiptyline are names that she remembers.  She thinks the Topamax was the most effective in decreasing her headache frequency and severity.     She says the Imitrex used to be helpful, but it is less-so now. She does not think she has tried any other triptans.  She was taking Tylenol, ibuprofen and Aleve multiple times daily, but she has decreased this to just 1 or 2 days/week.     She had been planning to try and get pregnant, but says she has stepped back from that for now.  She mentions that she is terrible at taking medications, but she is getting better.     She has numbness and pain around the wrists and in the right hand almost all the time.  Today she has a lot of neck stiffness. She has seen a chiropractor for this. It has been about a year since her last eye exam. No visual changes.    We decided to get an MRI of her brain and cervical spine.  I also referred her to pain clinic for trigger point injections. She says that the trigger point injections helped for about half an hour afterwards, and then she felt like she was back to her baseline.  Brain parenchyma was normal on imaging.  The MRI did show some mastoid opacification.  There were couple of incidental findings on her cervical spine MRI as well, including a small likely perineural cyst and an aberrant right subclavian artery.  Also switched her Imitrex to  Maxalt.    She mentions that she was told she has been told that she had some congestion in the areas behind her ears in the past as well.      Objective         Vitals:  No vitals were obtained today due to virtual visit.    Physical Exam   Alert, attentive.  Speech is fluent.  Adequate fund of knowledge.    Relevant Data:  MRI Brain (2.9.21):  IMPRESSION:  1. Unremarkable MRI of the head.  2. Opacification of the mastoid cavities, likely  infectious/inflammatory.      MRI Cervical Spine (2.9.21):  IMPRESSION:    1. Slight reversal of the normal cervical lordosis.   2. No significant stenoses.  3. Probable small synovial cyst or perineural cyst projecting  laterally along the right C6-C7 facet joint, likely incidental.  4. Incidental aberrant right subclavian artery.    Imaging reviewed independently by me.  Agree with radiology read.    Phone call duration: 14 minutes    Dragon software used in the dictation of this note.          Again, thank you for allowing me to participate in the care of your patient.        Sincerely,        Yue Fields MD

## 2021-02-18 NOTE — PATIENT INSTRUCTIONS
Patient Instructions:  -- Start Topamax: 25mg at bedtime for 1 week, then 25mg twice daily for 1 week, then 25mg in the morning and 50mg in the evening for 1 week and finally 50mg twice daily.  *Information provided.  -- Let us know how you are feeling after a week or two on the 50mg twice daily dosing.  We may have to adjust the dose further for effectiveness.  -- Referral for Acupuncture.  -- Continue the Maxalt as needed for migraines.   -- Return to Neurology in 3 months.

## 2021-02-26 ENCOUNTER — OFFICE VISIT (OUTPATIENT)
Dept: ORTHOPEDICS | Facility: CLINIC | Age: 26
End: 2021-02-26
Payer: COMMERCIAL

## 2021-02-26 VITALS
BODY MASS INDEX: 42.56 KG/M2 | HEIGHT: 68 IN | DIASTOLIC BLOOD PRESSURE: 76 MMHG | SYSTOLIC BLOOD PRESSURE: 112 MMHG | WEIGHT: 280.8 LBS

## 2021-02-26 DIAGNOSIS — G56.03 BILATERAL CARPAL TUNNEL SYNDROME: ICD-10-CM

## 2021-02-26 DIAGNOSIS — M54.2 CERVICALGIA: Primary | ICD-10-CM

## 2021-02-26 DIAGNOSIS — G44.86 CERVICOGENIC HEADACHE: ICD-10-CM

## 2021-02-26 PROCEDURE — 99202 OFFICE O/P NEW SF 15 MIN: CPT | Performed by: FAMILY MEDICINE

## 2021-02-26 ASSESSMENT — MIFFLIN-ST. JEOR: SCORE: 2062.2

## 2021-02-26 NOTE — PATIENT INSTRUCTIONS
# Cervicalgia: Notable since diagnosed with suspected drug induced lupus.  Pain over paraspinal regoin with no radicular symptoms.  Cervical MRI negative.  Concern cause being cervicalgia due to tight cervical muscles.  Plan to treat with     # Right > Left Carpal Tunnel Syndrome: Mild numbness and tingling worse while sleeping.  May have some mild tennis elbow as well   Image Findings: negative cervical MRI  Treatment: external order for physical therapy for neck, wrist braces at night   Job: no restrictions   Medications/Injections: Limited tylenol/ibuprofen for pain for 1-2 week  Follow-up: In one month if symptoms do not improve, sooner if worsening      Please call [phone]   Ask for my team if you have any questions or concerns    It was great seeing you today!    Memo Neely MD, [facility]      Patient Education     Carpal Tunnel Syndrome Prevention Tips  Carpal tunnel syndrome is a painful condition in the hand and wrist. It occurs when there is too much pressure on the median nerve at the wrist. The median nerve runs from your forearm to the palm of your hand. It may get squeezed or pressed when it passes through the carpal tunnel from your wrist to your hand. You may then feel numbness, tingling, pain, or weakness in your hand and up your forearm.  Doing the same hand activities over and over can put you at higher risk for carpal tunnel syndrome. But you can reduce your risk. Learn how to change the way you use your hands. Below are tips for at home and on the job. Also follow the hand and wrist safety policies at your workplace.      Keep your wrist in a straight (neutral) position when exercising.      Keep your wrist in neutral  Keep a straight (neutral) wrist position as often as you can. Don t use your wrist in a bent (flexed) position for long periods of time. This includes extended or twisted positions.  When you sleep, don't have your wrist flexed (don't sleep all curled up on your side).  And don't put extra pressure on your wrist for long periods of time (don't sleep on your stomach with your hands under you).  Watch your   Don t use only your thumb and index finger to grasp or lift something. This can put stress on your wrist. When you can, use your whole hand and all its fingers to grasp an object.  Minimize repetition  Don t move your arms or hands the same way for long periods of time. And don't hold an object in the same way for long periods of time. Even simple, light tasks can cause injury this way. Instead, switch tasks or switch hands.  Rest your hands  Give your hands a break from time to time with a rest. Even a few minutes once an hour can help.  Reduce speed and force  Slow down when you do a forceful, repetitive motion. This gives your wrist time to recover from the effort. Use power tools to help reduce the force.  Strengthen the muscles  Weak muscles may lead to a poor wrist or arm position. Exercises will make your hand and arm muscles stronger. This can help you keep a better position.  [employer] last reviewed this educational content on 1/1/2018 2000-2020 [employer]. [address] [place] [id number]. All rights reserved. This information is not intended as a substitute for professional medical care. Always follow your healthcare professional's instructions.

## 2021-02-26 NOTE — PROGRESS NOTES
ASSESSMENT & PLAN  Sugar was seen today for pain.    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  Pain over paraspinal regoin with no radicular symptoms.  Cervical MRI negative.  Concern cause being cervicalgia due to tight cervical muscles.  Patient has seen neurology with recent initiation of Topamax to help with migraine headaches.  She is also seen a pain physician status post trigger point injections on 2/15/2021.  Patient does have bilateral paraspinal muscle tenderness concerned that these may be contributing to cervicogenic headaches.  Plan to treat with home exercises and referral to physical therapy.     # Right > Left Carpal Tunnel Syndrome: Mild numbness and tingling worse while sleeping.  May have some mild tennis elbow as well .  Plan to treat with home exercises and follow-up if not improved.    Image Findings: negative cervical MRI  Treatment: external order for physical therapy for neck, wrist braces at night   Job: no restrictions   Medications/Injections: Limited tylenol/ibuprofen for pain for 1-2 week  Follow-up: In one month if symptoms do not improve, sooner if worsening      Concerning signs/sx that would warrant urgent evaluation were discussed.  All questions were answered, patient understands and agrees with plan.      Return in about 1 month (around 3/26/2021), or if symptoms worsen or fail to improve.    -----    SUBJECTIVE  Sugar Bonilla is a/an 25 year old Right handed female who is seen in consultation at the request of  Yue Cleveland*   M.D. for evaluation of bilateral neck pain. The patient is seen by themselves.    Onset: May 2020-1.5 years(s) ago. Reports insidious onset without acute precipitating event. 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  Numbness and tingling have progressively worsened in lat year. Treating with neurology for cervicogenic HA. Recent Cervical MRI completed 2/9/21.   Care team: Dr. Alejo, Dr. Fields, Dr. Bolanos  HA improved with Topamax for one week  HA worse during sex  Can have a sharp pain afterwards  There was a concern for drug induced lupus but reported labs were negative.    Location of Pain: bilateral arm, right hand is worse   Rating of Pain at worst: 7/10  Rating of Pain Currently: 0/10  Worsened by: repetitive movements, night pain   Better with: ice, compression with towel    Treatments tried: topomax (headaches), EMG 11/9/2020  Quality: aching, dull  Associated symptoms: numbness, tingling, weakness of arms, dropping dishes   Orthopedic history: YES -medication induced Lupus   Relevant surgical history: NO  Social: New job desk work  History reviewed. No pertinent past medical history.  Social History     Socioeconomic History     Marital status: Single     Spouse name: Not on file     Number of children: Not on file     Years of education: Not on file     Highest education level: Not on file   Occupational History     Not on file   Social Needs     Financial resource strain: Not on file     Food insecurity     Worry: Not on file     Inability: Not on file     Transportation needs     Medical: Not on file     Non-medical: Not on file   Tobacco Use     Smoking status: Current Every Day Smoker     Packs/day: 0.50     Types: Cigarettes     Smokeless tobacco: Never Used   Substance and Sexual Activity     Alcohol use: Not on file     Drug use: Not on file     Sexual activity: Not on file   Lifestyle     Physical activity     Days per week: Not on file     Minutes per  "session: Not on file     Stress: Not on file   Relationships     Social connections     Talks on phone: Not on file     Gets together: Not on file     Attends Denominational service: Not on file     Active member of club or organization: Not on file     Attends meetings of clubs or organizations: Not on file     Relationship status: Not on file     Intimate partner violence     Fear of current or ex partner: Not on file     Emotionally abused: Not on file     Physically abused: Not on file     Forced sexual activity: Not on file   Other Topics Concern     Not on file   Social History Narrative     Not on file         Patient's past medical, surgical, social, and family histories were reviewed today and no changes are noted.  No family history pertinent to the patient's problem today    REVIEW OF SYSTEMS:  10 point ROS is negative other than symptoms noted above in HPI, Past Medical History or as stated below  Constitutional: NEGATIVE for fever, chills, change in weight  Skin: NEGATIVE for worrisome rashes, moles or lesions  GI/: NEGATIVE for bowel or bladder changes  Neuro: NEGATIVE for weakness, dizziness or paresthesias    OBJECTIVE:  /76   Ht 1.727 m (5' 8\")   Wt 127.4 kg (280 lb 12.8 oz)   BMI 42.70 kg/m     General: healthy, alert and in no distress  HEENT: no scleral icterus or conjunctival erythema  Skin: no suspicious lesions or rash. No jaundice.  CV: regular rhythm by palpation  Resp: normal respiratory effort without conversational dyspnea   Psych: normal mood and affect  Gait: normal steady gait with appropriate coordination and balance  Neuro: normal light touch sensory exam of the bilateral upper extremities.    MSK:  CERVICAL SPINE  Inspection:    normal cervical lordosis present, rounded shoulders, forward head posture  Palpation:    Tender about the paracervical musculature (bilateral). Otherwise remainder of the landmarks and nontender.  Range of Motion:     Flexion full    Extension full    " Right side bend full    Left side bend full    Right rotation full    Left rotation full  Strength:    Full strength throughout all neck muscles  Special Tests:    Positive: Mildly positive Phalen's right > left    Negative: Spurling's (bilateral)    BILATERAL SHOULDER  Inspection:    no swelling, bruising, discoloration, or obvious deformity or asymmetry  Palpation:  Nontender.  Active Range of Motion:     Abduction 1800, FF 1800, , IR L1.    Strength:    Scapular plane abduction 5/5,  ER 5/5, IR 5/5, biceps 5/5, triceps 5/5  Special Tests:    Positive: None    Negative: Neer's, Rico' and supraspinatus (empty can)    Independent visualization of the below image:  No results found for this or any previous visit (from the past 24 hour(s)).  MRI CERVICAL SPINE WITHOUT CONTRAST February 9, 2021 11:38 AM      HISTORY: Cervical radiculopathy, no red flags. Cervicogenic headache.     TECHNIQUE: Multiplanar, multisequence MRI of the cervical spine  without contrast.      COMPARISON: X-rays 12/18/2020.     FINDINGS: Mild motion artifact on multiple series. Alignment is  significant for slight reversal of normal cervical lordosis. Bone  marrow is within normal limits. No abnormal cord signal. No  significant spinal canal or foraminal stenosis. Probable incidental 9  mm synovial cyst or perineural cyst projecting laterally along the  right C6-C7 facet joint. Incidental aberrant right subclavian artery.                                                                      IMPRESSION:    1. Slight reversal of the normal cervical lordosis.   2. No significant stenoses.  3. Probable small synovial cyst or perineural cyst projecting  laterally along the right C6-C7 facet joint, likely incidental.  4. Incidental aberrant right subclavian artery.     ERENDIRA ESTRADA MD  I visualized and reviewed these images with the patient      Memo Neely MD [facility]

## 2021-02-26 NOTE — LETTER
2/26/2021         RE: Sugar Bonilla  [address]  [place] [address]        Dear Colleague,    Thank you for referring your patient, Sugar Bonilla, to the [facility] [place]. Please see a copy of my visit note below.    ASSESSMENT & PLAN  Sugar was seen today for pain.    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  Pain over paraspinal regoin with no radicular symptoms.  Cervical MRI negative.  Concern cause being cervicalgia due to tight cervical muscles.  Patient has seen neurology with recent initiation of Topamax to help with migraine headaches.  She is also seen a pain physician status post trigger point injections on 2/15/2021.  Patient does have bilateral paraspinal muscle tenderness concerned that these may be contributing to cervicogenic headaches.  Plan to treat with home exercises and referral to physical therapy.     # Right > Left Carpal Tunnel Syndrome: Mild numbness and tingling worse while sleeping.  May have some mild tennis elbow as well .  Plan to treat with home exercises and follow-up if not improved.    Image Findings: negative cervical MRI  Treatment: external order for physical therapy for neck, wrist braces at night   Job: no restrictions   Medications/Injections: Limited tylenol/ibuprofen for pain for 1-2 week  Follow-up: In one month if symptoms do not improve, sooner if worsening      Concerning signs/sx that would warrant urgent evaluation were discussed.  All questions were answered, patient  understands and agrees with plan.      Return in about 1 month (around 3/26/2021), or if symptoms worsen or fail to improve.    -----    SUBJECTIVE  Sugar Bonilla is a/an 25 year old Right handed female who is seen in consultation at the request of  Yue Luevano M.D. for evaluation of bilateral neck pain. The patient is seen by themselves.    Onset: May 2020-1.5 years(s) ago. Reports insidious onset without acute precipitating event. 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  Numbness and tingling have progressively worsened in lat year. Treating with neurology for cervicogenic HA. Recent Cervical MRI completed 2/9/21.   Care team: Dr. Alejo, Dr. Fields, Dr. Bolanos  HA improved with Topamax for one week  HA worse during sex  Can have a sharp pain afterwards  There was a concern for drug induced lupus but reported labs were negative.    Location of Pain: bilateral arm, right hand is worse   Rating of Pain at worst: 7/10  Rating of Pain Currently: 0/10  Worsened by: repetitive movements, night pain   Better with: ice, compression with towel    Treatments tried: topomax (headaches), EMG 11/9/2020  Quality: aching, dull  Associated symptoms: numbness, tingling, weakness of arms, dropping dishes   Orthopedic history: YES -medication induced Lupus   Relevant surgical history: NO  Social: New job desk work  History reviewed. No pertinent past medical history.  Social History     Socioeconomic History     Marital status: Single     Spouse name: Not on file     Number of children: Not on file     Years of education: Not on file     Highest education level: Not on file   Occupational History     Not on file   Social Needs     Financial resource strain: Not on file     Food insecurity     Worry: Not on file     Inability: Not on file     Transportation needs     Medical: Not on file     Non-medical: Not on file   Tobacco Use     Smoking status: Current Every Day Smoker      "Packs/day: 0.50     Types: Cigarettes     Smokeless tobacco: Never Used   Substance and Sexual Activity     Alcohol use: Not on file     Drug use: Not on file     Sexual activity: Not on file   Lifestyle     Physical activity     Days per week: Not on file     Minutes per session: Not on file     Stress: Not on file   Relationships     Social connections     Talks on phone: Not on file     Gets together: Not on file     Attends Jew service: Not on file     Active member of club or organization: Not on file     Attends meetings of clubs or organizations: Not on file     Relationship status: Not on file     Intimate partner violence     Fear of current or ex partner: Not on file     Emotionally abused: Not on file     Physically abused: Not on file     Forced sexual activity: Not on file   Other Topics Concern     Not on file   Social History Narrative     Not on file         Patient's past medical, surgical, social, and family histories were reviewed today and no changes are noted.  No family history pertinent to the patient's problem today    REVIEW OF SYSTEMS:  10 point ROS is negative other than symptoms noted above in HPI, Past Medical History or as stated below  Constitutional: NEGATIVE for fever, chills, change in weight  Skin: NEGATIVE for worrisome rashes, moles or lesions  GI/: NEGATIVE for bowel or bladder changes  Neuro: NEGATIVE for weakness, dizziness or paresthesias    OBJECTIVE:  /76   Ht 1.727 m (5' 8\")   Wt 127.4 kg (280 lb 12.8 oz)   BMI 42.70 kg/m     General: healthy, alert and in no distress  HEENT: no scleral icterus or conjunctival erythema  Skin: no suspicious lesions or rash. No jaundice.  CV: regular rhythm by palpation  Resp: normal respiratory effort without conversational dyspnea   Psych: normal mood and affect  Gait: normal steady gait with appropriate coordination and balance  Neuro: normal light touch sensory exam of the bilateral upper extremities.    MSK:  CERVICAL " SPINE  Inspection:    normal cervical lordosis present, rounded shoulders, forward head posture  Palpation:    Tender about the paracervical musculature (bilateral). Otherwise remainder of the landmarks and nontender.  Range of Motion:     Flexion full    Extension full    Right side bend full    Left side bend full    Right rotation full    Left rotation full  Strength:    Full strength throughout all neck muscles  Special Tests:    Positive: Mildly positive Phalen's right > left    Negative: Spurling's (bilateral)    BILATERAL SHOULDER  Inspection:    no swelling, bruising, discoloration, or obvious deformity or asymmetry  Palpation:  Nontender.  Active Range of Motion:     Abduction 1800, FF 1800, , IR L1.    Strength:    Scapular plane abduction 5/5,  ER 5/5, IR 5/5, biceps 5/5, triceps 5/5  Special Tests:    Positive: None    Negative: Neer's, Rico' and supraspinatus (empty can)    Independent visualization of the below image:  No results found for this or any previous visit (from the past 24 hour(s)).  MRI CERVICAL SPINE WITHOUT CONTRAST February 9, 2021 11:38 AM      HISTORY: Cervical radiculopathy, no red flags. Cervicogenic headache.     TECHNIQUE: Multiplanar, multisequence MRI of the cervical spine  without contrast.      COMPARISON: X-rays 12/18/2020.     FINDINGS: Mild motion artifact on multiple series. Alignment is  significant for slight reversal of normal cervical lordosis. Bone  marrow is within normal limits. No abnormal cord signal. No  significant spinal canal or foraminal stenosis. Probable incidental 9  mm synovial cyst or perineural cyst projecting laterally along the  right C6-C7 facet joint. Incidental aberrant right subclavian artery.                                                                      IMPRESSION:    1. Slight reversal of the normal cervical lordosis.   2. No significant stenoses.  3. Probable small synovial cyst or perineural cyst projecting  laterally along the  right C6-C7 facet joint, likely incidental.  4. Incidental aberrant right subclavian artery.     ERENDIRA ESTRADA MD  I visualized and reviewed these images with the patient      Memo Neely MD [facility]          Again, thank you for allowing me to participate in the care of your patient.        Sincerely,        Memo Neely MD

## 2021-04-20 DIAGNOSIS — L93.2 DRUG-INDUCED LUPUS ERYTHEMATOSUS: ICD-10-CM

## 2021-04-20 DIAGNOSIS — L74.510 AXILLARY HYPERHIDROSIS: ICD-10-CM

## 2021-04-20 DIAGNOSIS — T50.905A DRUG-INDUCED LUPUS ERYTHEMATOSUS: ICD-10-CM

## 2021-04-20 RX ORDER — DOXYCYCLINE 100 MG/1
100 CAPSULE ORAL 2 TIMES DAILY
Qty: 60 CAPSULE | Refills: 0 | Status: SHIPPED | OUTPATIENT
Start: 2021-04-20 | End: 2022-10-03

## 2021-04-20 NOTE — TELEPHONE ENCOUNTER
Requested Prescriptions   Pending Prescriptions Disp Refills     doxycycline monohydrate (MONODOX) 100 MG capsule 60 capsule 0     Sig: Take 1 capsule (100 mg) by mouth 2 times daily       There is no refill protocol information for this order        Last office visit: 2/19/2020 with prescribing provider:  Dr. Washington   Future Office Visit:          Denise Behrendt  Specialty CSS

## 2021-04-20 NOTE — TELEPHONE ENCOUNTER
Do you want Rheumatology to order Doxycyline as she is now seeing Dr Alejo?..    Please advise. Fariba Fisher RN

## 2021-04-24 ENCOUNTER — HEALTH MAINTENANCE LETTER (OUTPATIENT)
Age: 26
End: 2021-04-24

## 2021-07-19 DIAGNOSIS — M06.4 INFLAMMATORY POLYARTHROPATHY (H): ICD-10-CM

## 2021-07-19 RX ORDER — AZATHIOPRINE 50 MG/1
50 TABLET ORAL DAILY
Qty: 30 TABLET | Refills: 2 | OUTPATIENT
Start: 2021-07-19

## 2021-07-19 NOTE — TELEPHONE ENCOUNTER
RN: Please call to notify Sugar Bonilla that labs are required prior to azathioprine refill.  Please resubmit the azathioprine refill request after labs are completed.      Omar Alejo MD  7/19/2021 1:34 PM

## 2021-07-19 NOTE — TELEPHONE ENCOUNTER
Routing refill request to provider for review/approval because:  Drug not on the [facility] refill protocol           Pending Prescriptions:                       Disp   Refills    azaTHIOprine (IMURAN) 50 MG tablet         30 tab*2        Sig: Take 1 tablet (50 mg) by mouth daily        Mark Williamson RN

## 2021-07-19 NOTE — TELEPHONE ENCOUNTER
Left message for patient to return call to clinic. Also sent a [employer] message.    Andres Washburn RN....7/19/2021 3:09 PM

## 2021-07-29 DIAGNOSIS — T50.905A DRUG-INDUCED LUPUS ERYTHEMATOSUS: ICD-10-CM

## 2021-07-29 DIAGNOSIS — L93.2 DRUG-INDUCED LUPUS ERYTHEMATOSUS: ICD-10-CM

## 2021-07-29 DIAGNOSIS — L74.510 AXILLARY HYPERHIDROSIS: ICD-10-CM

## 2021-07-29 RX ORDER — DOXYCYCLINE 100 MG/1
100 CAPSULE ORAL 2 TIMES DAILY
Qty: 60 CAPSULE | Refills: 0 | Status: CANCELLED | OUTPATIENT
Start: 2021-07-29

## 2021-07-29 NOTE — TELEPHONE ENCOUNTER
Spoke to patient who did not request this refill.     I let the pharmacy know as well as told patient she will need to be seen if she needs any Dermatology medications since it has been 1 year since she was last seen.     Pharmacy stated this medication was last refilled in June 2021.    Fariba Fisher RN

## 2021-10-09 ENCOUNTER — HEALTH MAINTENANCE LETTER (OUTPATIENT)
Age: 26
End: 2021-10-09

## 2022-05-16 ENCOUNTER — HEALTH MAINTENANCE LETTER (OUTPATIENT)
Age: 27
End: 2022-05-16

## 2022-09-11 ENCOUNTER — HEALTH MAINTENANCE LETTER (OUTPATIENT)
Age: 27
End: 2022-09-11

## 2022-10-03 ENCOUNTER — LAB (OUTPATIENT)
Dept: LAB | Facility: CLINIC | Age: 27
End: 2022-10-03
Payer: COMMERCIAL

## 2022-10-03 DIAGNOSIS — Z20.822 ENCOUNTER FOR LABORATORY TESTING FOR COVID-19 VIRUS: ICD-10-CM

## 2022-10-03 PROCEDURE — U0005 INFEC AGEN DETEC AMPLI PROBE: HCPCS

## 2022-10-03 PROCEDURE — U0003 INFECTIOUS AGENT DETECTION BY NUCLEIC ACID (DNA OR RNA); SEVERE ACUTE RESPIRATORY SYNDROME CORONAVIRUS 2 (SARS-COV-2) (CORONAVIRUS DISEASE [COVID-19]), AMPLIFIED PROBE TECHNIQUE, MAKING USE OF HIGH THROUGHPUT TECHNOLOGIES AS DESCRIBED BY CMS-2020-01-R: HCPCS

## 2022-10-04 LAB — SARS-COV-2 RNA RESP QL NAA+PROBE: NEGATIVE

## 2022-10-05 ENCOUNTER — ANESTHESIA EVENT (OUTPATIENT)
Dept: SURGERY | Facility: CLINIC | Age: 27
End: 2022-10-05
Payer: COMMERCIAL

## 2022-10-05 ASSESSMENT — LIFESTYLE VARIABLES: TOBACCO_USE: 1

## 2022-10-05 NOTE — ANESTHESIA PREPROCEDURE EVALUATION
Anesthesia Pre-Procedure Evaluation    Patient: Sugar Bonilla   MRN: [id number] : 1995        Procedure : Procedure(s):  KNEE, ANTERIOR CRUCIATE LIGAMENT Reconstruction with Tibialis Anterior Allograft          No past medical history on file.   No past surgical history on file.   Allergies   Allergen Reactions     Cefaclor Nausea     Humira      It gave me drug induced lupus      Social History     Tobacco Use     Smoking status: Current Every Day Smoker     Packs/day: 0.50     Types: Cigarettes     Smokeless tobacco: Never Used   Substance Use Topics     Alcohol use: Not on file      Wt Readings from Last 1 Encounters:   21 127.4 kg (280 lb 12.8 oz)        Anesthesia Evaluation            ROS/MED HX  ENT/Pulmonary:     (+) tobacco use,     Neurologic:  - neg neurologic ROS     Cardiovascular:  - neg cardiovascular ROS     METS/Exercise Tolerance:     Hematologic:  - neg hematologic  ROS     Musculoskeletal:  - neg musculoskeletal ROS     GI/Hepatic:  - neg GI/hepatic ROS     Renal/Genitourinary:  - neg Renal ROS     Endo:     (+) Obesity,     Psychiatric/Substance Use:  - neg psychiatric ROS     Infectious Disease:  - neg infectious disease ROS     Malignancy:  - neg malignancy ROS     Other:  - neg other ROS          Physical Exam    Airway  airway exam normal      Mallampati: II   TM distance: > 3 FB   Neck ROM: full   Mouth opening: > 3 cm    Respiratory Devices and Support         Dental  no notable dental history         Cardiovascular   cardiovascular exam normal          Pulmonary   pulmonary exam normal                OUTSIDE LABS:  CBC:   Lab Results   Component Value Date    WBC 6.9 2020    WBC 7.8 2020    HGB 14.3 2020    HGB 14.3 2020    HCT 41.1 2020    HCT 42.1 2020     2020     2020     BMP:   Lab Results   Component Value Date     2020     2020    POTASSIUM 3.8 2020    POTASSIUM 3.6  08/05/2020    CHLORIDE 108 11/09/2020    CHLORIDE 103 08/05/2020    CO2 27 11/09/2020    CO2 25 08/05/2020    BUN 19 11/09/2020    BUN 10 08/05/2020    CR 0.86 12/18/2020    CR 0.82 11/09/2020    GLC 93 11/09/2020     (H) 08/05/2020     COAGS: No results found for: PTT, INR, FIBR  POC: No results found for: BGM, HCG, HCGS  HEPATIC:   Lab Results   Component Value Date    ALBUMIN 3.8 12/18/2020    PROTTOTAL 7.8 12/18/2020    ALT 19 12/18/2020    AST 14 12/18/2020    ALKPHOS 73 12/18/2020    BILITOTAL 0.4 12/18/2020     OTHER:   Lab Results   Component Value Date    BK 8.9 11/09/2020    CRP 10.3 (H) 11/09/2020    SED 13 11/09/2020       Anesthesia Plan    ASA Status:  3   NPO Status:  NPO Appropriate    Anesthesia Type: Spinal.      Maintenance: TIVA.        Consents    Anesthesia Plan(s) and associated risks, benefits, and realistic alternatives discussed. Questions answered and patient/representative(s) expressed understanding.    - Discussed:     - Discussed with:  Patient         Postoperative Care    Pain management: Peripheral nerve block (Single Shot), Multi-modal analgesia.   PONV prophylaxis: Ondansetron (or other 5HT-3), Dexamethasone or Solumedrol     Comments:                KYARA Deleon CRNA

## 2022-10-06 ENCOUNTER — HOSPITAL ENCOUNTER (OUTPATIENT)
Facility: CLINIC | Age: 27
Discharge: HOME | End: 2022-10-06
Attending: ORTHOPAEDIC SURGERY | Admitting: ORTHOPAEDIC SURGERY
Payer: COMMERCIAL

## 2022-10-06 ENCOUNTER — ANESTHESIA (OUTPATIENT)
Dept: SURGERY | Facility: CLINIC | Age: 27
End: 2022-10-06
Payer: COMMERCIAL

## 2022-10-06 VITALS
DIASTOLIC BLOOD PRESSURE: 60 MMHG | SYSTOLIC BLOOD PRESSURE: 102 MMHG | HEIGHT: 68 IN | HEART RATE: 70 BPM | WEIGHT: 280.79 LBS | BODY MASS INDEX: 42.55 KG/M2 | TEMPERATURE: 97.5 F | OXYGEN SATURATION: 99 % | RESPIRATION RATE: 18 BRPM

## 2022-10-06 DIAGNOSIS — S83.511A COMPLETE TEAR OF ANTERIOR CRUCIATE LIGAMENT OF RIGHT KNEE, INITIAL ENCOUNTER: Primary | ICD-10-CM

## 2022-10-06 LAB — HCG UR QL: NEGATIVE

## 2022-10-06 PROCEDURE — 250N000009 HC RX 250: Performed by: ORTHOPAEDIC SURGERY

## 2022-10-06 PROCEDURE — 250N000011 HC RX IP 250 OP 636: Performed by: NURSE ANESTHETIST, CERTIFIED REGISTERED

## 2022-10-06 PROCEDURE — 250N000011 HC RX IP 250 OP 636: Performed by: ORTHOPAEDIC SURGERY

## 2022-10-06 PROCEDURE — 250N000009 HC RX 250: Performed by: NURSE ANESTHETIST, CERTIFIED REGISTERED

## 2022-10-06 PROCEDURE — 999N000141 HC STATISTIC PRE-PROCEDURE NURSING ASSESSMENT: Performed by: ORTHOPAEDIC SURGERY

## 2022-10-06 PROCEDURE — 272N000001 HC OR GENERAL SUPPLY STERILE: Performed by: ORTHOPAEDIC SURGERY

## 2022-10-06 PROCEDURE — C1713 ANCHOR/SCREW BN/BN,TIS/BN: HCPCS | Performed by: ORTHOPAEDIC SURGERY

## 2022-10-06 PROCEDURE — 258N000003 HC RX IP 258 OP 636: Performed by: NURSE ANESTHETIST, CERTIFIED REGISTERED

## 2022-10-06 PROCEDURE — 250N000025 HC SEVOFLURANE, PER MIN: Performed by: ORTHOPAEDIC SURGERY

## 2022-10-06 PROCEDURE — 370N000017 HC ANESTHESIA TECHNICAL FEE, PER MIN: Performed by: ORTHOPAEDIC SURGERY

## 2022-10-06 PROCEDURE — 250N000011 HC RX IP 250 OP 636: Performed by: PHYSICIAN ASSISTANT

## 2022-10-06 PROCEDURE — 81025 URINE PREGNANCY TEST: CPT | Performed by: ORTHOPAEDIC SURGERY

## 2022-10-06 PROCEDURE — 250N000013 HC RX MED GY IP 250 OP 250 PS 637: Performed by: NURSE ANESTHETIST, CERTIFIED REGISTERED

## 2022-10-06 PROCEDURE — 710N000010 HC RECOVERY PHASE 1, LEVEL 2, PER MIN: Performed by: ORTHOPAEDIC SURGERY

## 2022-10-06 PROCEDURE — C1762 CONN TISS, HUMAN(INC FASCIA): HCPCS | Performed by: ORTHOPAEDIC SURGERY

## 2022-10-06 PROCEDURE — 271N000001 HC OR GENERAL SUPPLY NON-STERILE: Performed by: ORTHOPAEDIC SURGERY

## 2022-10-06 PROCEDURE — 360N000077 HC SURGERY LEVEL 4, PER MIN: Performed by: ORTHOPAEDIC SURGERY

## 2022-10-06 PROCEDURE — 710N000012 HC RECOVERY PHASE 2, PER MINUTE: Performed by: ORTHOPAEDIC SURGERY

## 2022-10-06 DEVICE — GRAFT TENDON TIBIALIS ANTERIOR 430335: Type: IMPLANTABLE DEVICE | Site: KNEE | Status: FUNCTIONAL

## 2022-10-06 DEVICE — IMPLANTABLE DEVICE: Type: IMPLANTABLE DEVICE | Site: KNEE | Status: FUNCTIONAL

## 2022-10-06 DEVICE — IMP FIXATION DEVICE SNR ARTHRO ADJ ULTRABUTTON 72290003: Type: IMPLANTABLE DEVICE | Site: KNEE | Status: FUNCTIONAL

## 2022-10-06 RX ORDER — LIDOCAINE 40 MG/G
CREAM TOPICAL
Status: DISCONTINUED | OUTPATIENT
Start: 2022-10-06 | End: 2022-10-06 | Stop reason: HOSPADM

## 2022-10-06 RX ORDER — DEXAMETHASONE SODIUM PHOSPHATE 4 MG/ML
INJECTION, SOLUTION INTRA-ARTICULAR; INTRALESIONAL; INTRAMUSCULAR; INTRAVENOUS; SOFT TISSUE PRN
Status: DISCONTINUED | OUTPATIENT
Start: 2022-10-06 | End: 2022-10-06

## 2022-10-06 RX ORDER — SODIUM CHLORIDE, SODIUM LACTATE, POTASSIUM CHLORIDE, CALCIUM CHLORIDE 600; 310; 30; 20 MG/100ML; MG/100ML; MG/100ML; MG/100ML
INJECTION, SOLUTION INTRAVENOUS CONTINUOUS
Status: DISCONTINUED | OUTPATIENT
Start: 2022-10-06 | End: 2022-10-06 | Stop reason: HOSPADM

## 2022-10-06 RX ORDER — ONDANSETRON 2 MG/ML
4 INJECTION INTRAMUSCULAR; INTRAVENOUS EVERY 30 MIN PRN
Status: DISCONTINUED | OUTPATIENT
Start: 2022-10-06 | End: 2022-10-06 | Stop reason: HOSPADM

## 2022-10-06 RX ORDER — ONDANSETRON 4 MG/1
4 TABLET, ORALLY DISINTEGRATING ORAL EVERY 30 MIN PRN
Status: DISCONTINUED | OUTPATIENT
Start: 2022-10-06 | End: 2022-10-06 | Stop reason: HOSPADM

## 2022-10-06 RX ORDER — ONDANSETRON 2 MG/ML
INJECTION INTRAMUSCULAR; INTRAVENOUS PRN
Status: DISCONTINUED | OUTPATIENT
Start: 2022-10-06 | End: 2022-10-06

## 2022-10-06 RX ORDER — ACETAMINOPHEN 325 MG/1
975 TABLET ORAL ONCE
Status: COMPLETED | OUTPATIENT
Start: 2022-10-06 | End: 2022-10-06

## 2022-10-06 RX ORDER — PROPOFOL 10 MG/ML
INJECTION, EMULSION INTRAVENOUS PRN
Status: DISCONTINUED | OUTPATIENT
Start: 2022-10-06 | End: 2022-10-06

## 2022-10-06 RX ORDER — ROPIVACAINE HYDROCHLORIDE 5 MG/ML
INJECTION, SOLUTION EPIDURAL; INFILTRATION; PERINEURAL PRN
Status: DISCONTINUED | OUTPATIENT
Start: 2022-10-06 | End: 2022-10-06

## 2022-10-06 RX ORDER — HYDROXYZINE HYDROCHLORIDE 25 MG/1
25 TABLET, FILM COATED ORAL EVERY 6 HOURS PRN
Status: DISCONTINUED | OUTPATIENT
Start: 2022-10-06 | End: 2022-10-06 | Stop reason: HOSPADM

## 2022-10-06 RX ORDER — FENTANYL CITRATE 50 UG/ML
INJECTION, SOLUTION INTRAMUSCULAR; INTRAVENOUS PRN
Status: DISCONTINUED | OUTPATIENT
Start: 2022-10-06 | End: 2022-10-06

## 2022-10-06 RX ORDER — CEFAZOLIN SODIUM/WATER 3 G/30 ML
3 SYRINGE (ML) INTRAVENOUS SEE ADMIN INSTRUCTIONS
Status: DISCONTINUED | OUTPATIENT
Start: 2022-10-06 | End: 2022-10-06 | Stop reason: HOSPADM

## 2022-10-06 RX ORDER — OXYCODONE HYDROCHLORIDE 5 MG/1
5-10 TABLET ORAL EVERY 4 HOURS PRN
Qty: 30 TABLET | Refills: 0 | Status: SHIPPED | OUTPATIENT
Start: 2022-10-06 | End: 2023-02-22

## 2022-10-06 RX ORDER — LIDOCAINE HYDROCHLORIDE AND EPINEPHRINE 10; 10 MG/ML; UG/ML
INJECTION, SOLUTION INFILTRATION; PERINEURAL PRN
Status: DISCONTINUED | OUTPATIENT
Start: 2022-10-06 | End: 2022-10-06 | Stop reason: HOSPADM

## 2022-10-06 RX ORDER — MAGNESIUM SULFATE HEPTAHYDRATE 40 MG/ML
2 INJECTION, SOLUTION INTRAVENOUS ONCE
Status: DISCONTINUED | OUTPATIENT
Start: 2022-10-06 | End: 2022-10-06 | Stop reason: HOSPADM

## 2022-10-06 RX ORDER — HYDROXYZINE PAMOATE 25 MG/1
25-50 CAPSULE ORAL 4 TIMES DAILY PRN
Qty: 40 CAPSULE | Refills: 0 | Status: SHIPPED | OUTPATIENT
Start: 2022-10-06 | End: 2023-02-22

## 2022-10-06 RX ORDER — HYDROXYZINE HYDROCHLORIDE 50 MG/1
50 TABLET, FILM COATED ORAL EVERY 6 HOURS PRN
Status: DISCONTINUED | OUTPATIENT
Start: 2022-10-06 | End: 2022-10-06 | Stop reason: HOSPADM

## 2022-10-06 RX ORDER — HYDROMORPHONE HCL IN WATER/PF 6 MG/30 ML
0.4 PATIENT CONTROLLED ANALGESIA SYRINGE INTRAVENOUS EVERY 5 MIN PRN
Status: DISCONTINUED | OUTPATIENT
Start: 2022-10-06 | End: 2022-10-06 | Stop reason: HOSPADM

## 2022-10-06 RX ORDER — ACETAMINOPHEN 325 MG/1
650 TABLET ORAL EVERY 4 HOURS PRN
Qty: 50 TABLET | Refills: 0
Start: 2022-10-06 | End: 2023-02-22

## 2022-10-06 RX ORDER — KETAMINE HYDROCHLORIDE 10 MG/ML
INJECTION INTRAMUSCULAR; INTRAVENOUS PRN
Status: DISCONTINUED | OUTPATIENT
Start: 2022-10-06 | End: 2022-10-06

## 2022-10-06 RX ORDER — LIDOCAINE HYDROCHLORIDE AND EPINEPHRINE BITARTRATE 20; .01 MG/ML; MG/ML
INJECTION, SOLUTION SUBCUTANEOUS PRN
Status: DISCONTINUED | OUTPATIENT
Start: 2022-10-06 | End: 2022-10-06

## 2022-10-06 RX ORDER — CEFAZOLIN SODIUM/WATER 3 G/30 ML
3 SYRINGE (ML) INTRAVENOUS
Status: COMPLETED | OUTPATIENT
Start: 2022-10-06 | End: 2022-10-06

## 2022-10-06 RX ORDER — FENTANYL CITRATE 50 UG/ML
25 INJECTION, SOLUTION INTRAMUSCULAR; INTRAVENOUS
Status: DISCONTINUED | OUTPATIENT
Start: 2022-10-06 | End: 2022-10-06 | Stop reason: HOSPADM

## 2022-10-06 RX ORDER — FENTANYL CITRATE 50 UG/ML
50 INJECTION, SOLUTION INTRAMUSCULAR; INTRAVENOUS EVERY 5 MIN PRN
Status: DISCONTINUED | OUTPATIENT
Start: 2022-10-06 | End: 2022-10-06 | Stop reason: HOSPADM

## 2022-10-06 RX ORDER — KETOROLAC TROMETHAMINE 30 MG/ML
INJECTION, SOLUTION INTRAMUSCULAR; INTRAVENOUS PRN
Status: DISCONTINUED | OUTPATIENT
Start: 2022-10-06 | End: 2022-10-06

## 2022-10-06 RX ORDER — LIDOCAINE HYDROCHLORIDE 10 MG/ML
INJECTION, SOLUTION EPIDURAL; INFILTRATION; INTRACAUDAL; PERINEURAL PRN
Status: DISCONTINUED | OUTPATIENT
Start: 2022-10-06 | End: 2022-10-06

## 2022-10-06 RX ADMIN — SUGAMMADEX 200 MG: 100 INJECTION, SOLUTION INTRAVENOUS at 14:19

## 2022-10-06 RX ADMIN — FENTANYL CITRATE 100 MCG: 50 INJECTION, SOLUTION INTRAMUSCULAR; INTRAVENOUS at 11:51

## 2022-10-06 RX ADMIN — ROPIVACAINE HYDROCHLORIDE 20 ML: 5 INJECTION, SOLUTION EPIDURAL; INFILTRATION; PERINEURAL at 11:55

## 2022-10-06 RX ADMIN — DEXAMETHASONE SODIUM PHOSPHATE 4 MG: 4 INJECTION, SOLUTION INTRA-ARTICULAR; INTRALESIONAL; INTRAMUSCULAR; INTRAVENOUS; SOFT TISSUE at 13:16

## 2022-10-06 RX ADMIN — ACETAMINOPHEN 975 MG: 325 TABLET, FILM COATED ORAL at 11:32

## 2022-10-06 RX ADMIN — DEXAMETHASONE SODIUM PHOSPHATE 8 MG: 4 INJECTION, SOLUTION INTRA-ARTICULAR; INTRALESIONAL; INTRAMUSCULAR; INTRAVENOUS; SOFT TISSUE at 13:13

## 2022-10-06 RX ADMIN — LIDOCAINE HYDROCHLORIDE 2 ML: 10 INJECTION, SOLUTION EPIDURAL; INFILTRATION; INTRACAUDAL; PERINEURAL at 11:53

## 2022-10-06 RX ADMIN — PROPOFOL 200 MG: 10 INJECTION, EMULSION INTRAVENOUS at 13:16

## 2022-10-06 RX ADMIN — LIDOCAINE HYDROCHLORIDE 0.1 ML: 10 INJECTION, SOLUTION EPIDURAL; INFILTRATION; INTRACAUDAL; PERINEURAL at 11:35

## 2022-10-06 RX ADMIN — Medication 3 G: at 13:00

## 2022-10-06 RX ADMIN — FENTANYL CITRATE 100 MCG: 50 INJECTION, SOLUTION INTRAMUSCULAR; INTRAVENOUS at 13:17

## 2022-10-06 RX ADMIN — KETOROLAC TROMETHAMINE 15 MG: 30 INJECTION, SOLUTION INTRAMUSCULAR at 14:08

## 2022-10-06 RX ADMIN — HYDROXYZINE HYDROCHLORIDE 50 MG: 50 TABLET, FILM COATED ORAL at 15:07

## 2022-10-06 RX ADMIN — ROCURONIUM BROMIDE 50 MG: 50 INJECTION, SOLUTION INTRAVENOUS at 13:18

## 2022-10-06 RX ADMIN — DEXAMETHASONE SODIUM PHOSPHATE 4 MG: 4 INJECTION, SOLUTION INTRA-ARTICULAR; INTRALESIONAL; INTRAMUSCULAR; INTRAVENOUS; SOFT TISSUE at 11:55

## 2022-10-06 RX ADMIN — FENTANYL CITRATE 50 MCG: 50 INJECTION, SOLUTION INTRAMUSCULAR; INTRAVENOUS at 14:52

## 2022-10-06 RX ADMIN — SODIUM CHLORIDE, POTASSIUM CHLORIDE, SODIUM LACTATE AND CALCIUM CHLORIDE: 600; 310; 30; 20 INJECTION, SOLUTION INTRAVENOUS at 15:43

## 2022-10-06 RX ADMIN — SODIUM CHLORIDE, POTASSIUM CHLORIDE, SODIUM LACTATE AND CALCIUM CHLORIDE: 600; 310; 30; 20 INJECTION, SOLUTION INTRAVENOUS at 11:33

## 2022-10-06 RX ADMIN — HYDROMORPHONE HYDROCHLORIDE 0.4 MG: 0.2 INJECTION, SOLUTION INTRAMUSCULAR; INTRAVENOUS; SUBCUTANEOUS at 15:41

## 2022-10-06 RX ADMIN — MIDAZOLAM 2 MG: 1 INJECTION INTRAMUSCULAR; INTRAVENOUS at 11:51

## 2022-10-06 RX ADMIN — KETAMINE HYDROCHLORIDE 30 MG: 10 INJECTION, SOLUTION INTRAMUSCULAR; INTRAVENOUS at 13:23

## 2022-10-06 RX ADMIN — LIDOCAINE HYDROCHLORIDE 5 ML: 10 INJECTION, SOLUTION EPIDURAL; INFILTRATION; INTRACAUDAL; PERINEURAL at 13:16

## 2022-10-06 RX ADMIN — MIDAZOLAM 1 MG: 1 INJECTION INTRAMUSCULAR; INTRAVENOUS at 13:14

## 2022-10-06 RX ADMIN — Medication 50 MCG: at 13:18

## 2022-10-06 RX ADMIN — FENTANYL CITRATE 50 MCG: 50 INJECTION, SOLUTION INTRAMUSCULAR; INTRAVENOUS at 14:59

## 2022-10-06 RX ADMIN — HYDROMORPHONE HYDROCHLORIDE 0.4 MG: 0.2 INJECTION, SOLUTION INTRAMUSCULAR; INTRAVENOUS; SUBCUTANEOUS at 15:21

## 2022-10-06 RX ADMIN — ONDANSETRON 4 MG: 2 INJECTION INTRAMUSCULAR; INTRAVENOUS at 13:13

## 2022-10-06 RX ADMIN — Medication 5 ML: at 11:54

## 2022-10-06 ASSESSMENT — ACTIVITIES OF DAILY LIVING (ADL)
ADLS_ACUITY_SCORE: 24
ADLS_ACUITY_SCORE: 24
ADLS_ACUITY_SCORE: 35
ADLS_ACUITY_SCORE: 24

## 2022-10-06 NOTE — PROGRESS NOTES
Pt. Transported on cart on room air with Rn, to phase 2, bay #2, report given to Marco Antonio Patterson for transfer of care Scott Sturges, RN on 10/6/2022 at 4:16 PM

## 2022-10-06 NOTE — ANESTHESIA CARE TRANSFER NOTE
Patient: Sugar Lewis    Procedure: Procedure(s):  KNEE, ANTERIOR CRUCIATE LIGAMENT Reconstruction with Tibialis Anterior Allograft       Diagnosis: ACL (anterior cruciate ligament) rupture [S83.519A]  Diagnosis Additional Information: No value filed.    Anesthesia Type:   Spinal     Note:    Oropharynx: oropharynx clear of all foreign objects  Level of Consciousness: drowsy  Oxygen Supplementation: face mask    Independent Airway: airway patency satisfactory and stable  Dentition: dentition unchanged  Vital Signs Stable: post-procedure vital signs reviewed and stable  Report to RN Given: handoff report given  Patient transferred to: PACU    Handoff Report: Identifed the Patient, Identified the Reponsible Provider, Reviewed the pertinent medical history, Discussed the surgical course, Reviewed Intra-OP anesthesia mangement and issues during anesthesia, Set expectations for post-procedure period and Allowed opportunity for questions and acknowledgement of understanding      Vitals:  Vitals Value Taken Time   BP     Temp     Pulse     Resp     SpO2 98 % 10/06/22 1438   Vitals shown include unvalidated device data.    Electronically Signed By: KYARA Collazo CRNA  October 6, 2022  2:39 PM

## 2022-10-06 NOTE — ANESTHESIA POSTPROCEDURE EVALUATION
Patient: Sugar Lewis    Procedure: Procedure(s):  KNEE, ANTERIOR CRUCIATE LIGAMENT Reconstruction with Tibialis Anterior Allograft       Anesthesia Type:  Spinal    Note:  Disposition: Outpatient   Postop Pain Control: Uneventful            Sign Out: Well controlled pain   PONV: No   Neuro/Psych: Uneventful            Sign Out: Acceptable/Baseline neuro status   Airway/Respiratory: Uneventful            Sign Out: Acceptable/Baseline resp. status   CV/Hemodynamics: Uneventful            Sign Out: Acceptable CV status; No obvious hypovolemia; No obvious fluid overload   Other NRE: NONE   DID A NON-ROUTINE EVENT OCCUR? No           Last vitals:  Vitals Value Taken Time   /66 10/06/22 1500   Temp 36.4  C (97.5  F) 10/06/22 1434   Pulse 73 10/06/22 1504   Resp 7 10/06/22 1504   SpO2 98 % 10/06/22 1504   Vitals shown include unvalidated device data.    Electronically Signed By: KYARA De Anda CRNA  October 6, 2022  6:27 PM

## 2022-10-06 NOTE — OP NOTE
Procedure Date: 10/06/2022    PREOPERATIVE DIAGNOSIS:  Right knee anterior cruciate ligament tear.    POSTOPERATIVE DIAGNOSIS:  Right knee anterior cruciate ligament tear.    PROCEDURE:  Right knee ACL reconstruction with tibialis anterior allograft.    SURGEON:  Bran Harrison MD    ASSISTANT:  Loree Garcia PA-C.  The presence of a PA was necessary for positioning, retraction, graft preparation and passage, safe progression of the case.    ANESTHESIA:  General.    ESTIMATED BLOOD LOSS:  25 mL.     COMPLICATIONS:  None apparent.    DISPOSITION:  Stable to PACU.    IMPLANTS USED:  [employer] UltraButton with 9 x 25 mm Regenesorb BioComposite tibial interference screw and tibialis anterior allograft.    INDICATIONS FOR PROCEDURE:  Sugar is a 26-year-old female who injured her right knee at mid October when she fell off a horse.  She tried therapy, continued to have symptomatic instability.  Her role is to get back into horseback racing.  Given her current instability as well as her goals, she elected to proceed with a right knee ACL reconstruction with allograft in order to stabilize the knee and optimize long-term function.  She understood the risks of retear, infection, damage to vessels or nerves, blood clots, as well as the development of posttraumatic arthritis.    DESCRIPTION OF PROCEDURE:  Sugar was met in the preoperative holding area.  The right knee was marked.  Consent was reviewed.  She was then transferred to the operating theater.  After smooth induction of general anesthesia, she was positioned supine with a bump under her right hip and a side post about her right thigh.  A timeout was performed verifying the correct patient, procedure, location.  She received preoperative antibiotics.  The right lower extremity was then prepped and draped in a standard sterile fashion.    We started by making a standard anterolateral viewing portal followed by an anteromedial portal under spinal needle  visualization.  Diagnostic arthroscopy was carried out, which showed minimal effusion.  There was no significant chondral wear on the patella or on the trochlea.  In the medial compartment, the chondral surfaces of the medial meniscus were intact.  In the notch, the ACL was torn off its femoral origin.  In the lateral compartment, there is grade 1 fissuring of the lateral tibial plateau, although no lateral meniscus tear and no lateral femoral condyle or chondral injury.  We then turned our attention to reconstructing the ACL.  Her previous ACL was debrided with a shaver.  We cleared off the ACL origin off the lateral femoral condyle wall.  While I was doing this, the PA was prepping the allograft for insertion.  After prep, the graft measured 8.5 x 130 mm.  It was placed on 20 foot pounds of tension.    We then used a tip to tip femoral guide to create an 8.5 x 25 mm femoral socket.  A passing suture was placed through this.  We then used the tip to tip tibial guide to create an 8.5 x 40 mm tibial tunnel.  The passing suture was retrieved out the tibial tunnel.  We then passed the graft up through the tibial tunnel.  We anchored the UltraButton on the lateral aspect of the lateral femoral condyle and advanced the graft into the femoral tunnel leaving it 3-4 mm sized the end of the tunnel.  We then cycled the knee multiple times.  We placed a posterior drawer on the tibia and placed a 25 x 9 mm BioComposite tibial interference screw with good purchase.  We then went back to the femoral side, got a posterior drawer, and the knee in about 20 degrees of flexion, re-tensioned the graft to make sure it was fully tightened.  The knee was then brought through range of motion.  She hyperextended 5 degrees without any impingement and flexed to about 130.  Her Lachman was a 2A and she had no pivot shift compared to her marked preoperative pivot shift.  Happy with this, all wounds were thoroughly irrigated.  We closed the  subcutaneous layer with 2-0 Monocryl and skin with interrupted nylon sutures.  A sterile compressive dressing was applied.  Peewee was awoken from anesthesia and transferred to recovery room in stable condition.    POSTOPERATIVE PLAN:    1.  Weightbearing as tolerated on the right lower extremity.  Crutches as needed.  2.  Start physical therapy per ACL protocol next week.  3.  Return to clinic in 2 weeks for wound check, sooner with questions or concerns.    Bran Harrison MD        D: 10/06/2022   T: 10/06/2022   MT: ALONA    Name:     PEEWEE COBURN  MRN:      [id number]        Account:        [id number]   :      1995           Procedure Date: 10/06/2022     Document: [id number]

## 2022-10-06 NOTE — INTERVAL H&P NOTE
"The History and Physical has been reviewed, the patient has been examined and no changes have occurred in the patient's condition since the H & P was completed.        Clinical Conditions Present on Arrival:  Clinically Significant Risk Factors Present on Admission                   # Severe Obesity: Estimated body mass index is 42.69 kg/m  as calculated from the following:    Height as of this encounter: 1.727 m (5' 8\").    Weight as of this encounter: 127.4 kg (280 lb 12.6 oz).       "

## 2023-02-21 ASSESSMENT — ANXIETY QUESTIONNAIRES
GAD7 TOTAL SCORE: 6
GAD7 TOTAL SCORE: 6
6. BECOMING EASILY ANNOYED OR IRRITABLE: NEARLY EVERY DAY
7. FEELING AFRAID AS IF SOMETHING AWFUL MIGHT HAPPEN: NOT AT ALL
5. BEING SO RESTLESS THAT IT IS HARD TO SIT STILL: NOT AT ALL
2. NOT BEING ABLE TO STOP OR CONTROL WORRYING: NOT AT ALL
GAD7 TOTAL SCORE: 6
4. TROUBLE RELAXING: NEARLY EVERY DAY
1. FEELING NERVOUS, ANXIOUS, OR ON EDGE: NOT AT ALL
7. FEELING AFRAID AS IF SOMETHING AWFUL MIGHT HAPPEN: NOT AT ALL
8. IF YOU CHECKED OFF ANY PROBLEMS, HOW DIFFICULT HAVE THESE MADE IT FOR YOU TO DO YOUR WORK, TAKE CARE OF THINGS AT HOME, OR GET ALONG WITH OTHER PEOPLE?: SOMEWHAT DIFFICULT
3. WORRYING TOO MUCH ABOUT DIFFERENT THINGS: NOT AT ALL
IF YOU CHECKED OFF ANY PROBLEMS ON THIS QUESTIONNAIRE, HOW DIFFICULT HAVE THESE PROBLEMS MADE IT FOR YOU TO DO YOUR WORK, TAKE CARE OF THINGS AT HOME, OR GET ALONG WITH OTHER PEOPLE: SOMEWHAT DIFFICULT

## 2023-02-21 ASSESSMENT — PATIENT HEALTH QUESTIONNAIRE - PHQ9
10. IF YOU CHECKED OFF ANY PROBLEMS, HOW DIFFICULT HAVE THESE PROBLEMS MADE IT FOR YOU TO DO YOUR WORK, TAKE CARE OF THINGS AT HOME, OR GET ALONG WITH OTHER PEOPLE: NOT DIFFICULT AT ALL
SUM OF ALL RESPONSES TO PHQ QUESTIONS 1-9: 0
SUM OF ALL RESPONSES TO PHQ QUESTIONS 1-9: 0

## 2023-02-22 ENCOUNTER — VIRTUAL VISIT (OUTPATIENT)
Dept: INTERNAL MEDICINE | Facility: CLINIC | Age: 28
End: 2023-02-22
Payer: COMMERCIAL

## 2023-02-22 DIAGNOSIS — F33.9 RECURRENT MAJOR DEPRESSIVE DISORDER, REMISSION STATUS UNSPECIFIED (H): Primary | ICD-10-CM

## 2023-02-22 PROCEDURE — 99203 OFFICE O/P NEW LOW 30 MIN: CPT | Mod: VID | Performed by: INTERNAL MEDICINE

## 2023-02-22 RX ORDER — DROSPIRENONE AND ETHINYL ESTRADIOL TABLETS 0.02-3(28)
1 KIT ORAL DAILY
COMMUNITY
Start: 2022-12-21

## 2023-02-22 ASSESSMENT — PATIENT HEALTH QUESTIONNAIRE - PHQ9
SUM OF ALL RESPONSES TO PHQ QUESTIONS 1-9: 0
10. IF YOU CHECKED OFF ANY PROBLEMS, HOW DIFFICULT HAVE THESE PROBLEMS MADE IT FOR YOU TO DO YOUR WORK, TAKE CARE OF THINGS AT HOME, OR GET ALONG WITH OTHER PEOPLE: NOT DIFFICULT AT ALL

## 2023-02-22 ASSESSMENT — ANXIETY QUESTIONNAIRES: GAD7 TOTAL SCORE: 6

## 2023-02-22 NOTE — PROGRESS NOTES
"Sugar is a 27 year old who is being evaluated via a billable video visit.      How would you like to obtain your AVS? MyChart  If the video visit is dropped, the invitation should be resent by: Text to cell phone: [phone]  Will anyone else be joining your video visit? No          Assessment & Plan     (F33.9) Recurrent major depressive disorder, remission status unspecified (H)  (primary encounter diagnosis)  Comment: ongoing, stable on 75mg zoloft, would like to stay with this dose  Plan: sertraline (ZOLOFT) 50 MG tablet        Renewed.  Needs to establish care with new PCP. Closest [place] clinic is [place], encouraged to schedule something with them in the coming few months  6 months of refill given today.               BMI:   Estimated body mass index is 42.69 kg/m  as calculated from the following:    Height as of 10/6/22: 1.727 m (5' 8\").    Weight as of 10/6/22: 127.4 kg (280 lb 12.6 oz).           Return in about 4 months (around 6/22/2023) for Routine preventive, with any available provider.    Carlos Arreola MD  [facility]    Subjective   Sugar is a 27 year old accompanied by her self, presenting for the following health issues:  Recheck Medication (Discuss medication and renew zoloft. )      History of Present Illness       Mental Health Follow-up:  Patient presents to follow-up on Depression & Anxiety.Patient's depression since last visit has been:  Good  The patient is not having other symptoms associated with depression.  Patient's anxiety since last visit has been:  Good  The patient is not having other symptoms associated with anxiety.  Any significant life events: No  Patient is not feeling anxious or having panic attacks.  Patient has no concerns about alcohol or drug use.    She eats 2-3 servings of fruits and vegetables daily.She consumes 0 sweetened beverage(s) daily.She exercises with enough effort to increase her heart rate 30 to 60 minutes per day.  " She exercises with enough effort to increase her heart rate 3 or less days per week. She is missing 1 dose(s) of medications per week.  She is not taking prescribed medications regularly due to remembering to take.    Today's PHQ-9         PHQ-9 Total Score: 0    PHQ-9 Q9 Thoughts of better off dead/self-harm past 2 weeks :   Not at all    How difficult have these problems made it for you to do your work, take care of things at home, or get along with other people: Not difficult at all  Today's MIKI-7 Score: 6       Medication Followup of zoloft    Taking Medication as prescribed: yes    Side Effects:  None    Medication Helping Symptoms:  yes        Review of Systems         Objective           Vitals:  No vitals were obtained today due to virtual visit.    Physical Exam   GENERAL: Healthy, alert and no distress  EYES: Eyes grossly normal to inspection.  No discharge or erythema, or obvious scleral/conjunctival abnormalities.  RESP: No audible wheeze, cough, or visible cyanosis.  No visible retractions or increased work of breathing.    SKIN: Visible skin clear. No significant rash, abnormal pigmentation or lesions.  NEURO: Cranial nerves grossly intact.  Mentation and speech appropriate for age.  PSYCH: Mentation appears normal, affect normal/bright, judgement and insight intact, normal speech and appearance well-groomed.                Video-Visit Details    Type of service:  Video Visit   Video Start Time: 1:19pm  Video End Time:1:30 PM    Originating Location (pt. Location): Home    Distant Location (provider location):  On-site  Platform used for Video Visit: Trevon

## 2023-06-03 ENCOUNTER — HEALTH MAINTENANCE LETTER (OUTPATIENT)
Age: 28
End: 2023-06-03

## 2023-09-29 DIAGNOSIS — F33.9 RECURRENT MAJOR DEPRESSIVE DISORDER, REMISSION STATUS UNSPECIFIED (H): ICD-10-CM

## 2023-11-15 DIAGNOSIS — F33.9 RECURRENT MAJOR DEPRESSIVE DISORDER, REMISSION STATUS UNSPECIFIED (H): ICD-10-CM

## 2023-11-15 NOTE — TELEPHONE ENCOUNTER
Was to establish care with provider closer to his home  Short term refill given in Sept  She has yet to schedule an appointment, refill declined today    Carlos Arreola MD on 11/15/2023 at 10:03 AM

## 2023-11-21 DIAGNOSIS — F33.9 RECURRENT MAJOR DEPRESSIVE DISORDER, REMISSION STATUS UNSPECIFIED (H): ICD-10-CM

## 2024-07-13 ENCOUNTER — HEALTH MAINTENANCE LETTER (OUTPATIENT)
Age: 29
End: 2024-07-13

## 2025-03-12 ENCOUNTER — LAB REQUISITION (OUTPATIENT)
Dept: LAB | Facility: CLINIC | Age: 30
End: 2025-03-12
Payer: COMMERCIAL

## 2025-03-12 DIAGNOSIS — Z31.69 ENCOUNTER FOR OTHER GENERAL COUNSELING AND ADVICE ON PROCREATION: ICD-10-CM

## 2025-03-12 DIAGNOSIS — Z13.29 ENCOUNTER FOR SCREENING FOR OTHER SUSPECTED ENDOCRINE DISORDER: ICD-10-CM

## 2025-03-12 DIAGNOSIS — Z13.1 ENCOUNTER FOR SCREENING FOR DIABETES MELLITUS: ICD-10-CM

## 2025-03-12 LAB
EST. AVERAGE GLUCOSE BLD GHB EST-MCNC: 100 MG/DL
HBA1C MFR BLD: 5.1 %

## 2025-03-12 PROCEDURE — 84443 ASSAY THYROID STIM HORMONE: CPT | Mod: ORL | Performed by: OBSTETRICS & GYNECOLOGY

## 2025-03-12 PROCEDURE — 86762 RUBELLA ANTIBODY: CPT | Mod: ORL | Performed by: OBSTETRICS & GYNECOLOGY

## 2025-03-12 PROCEDURE — 86376 MICROSOMAL ANTIBODY EACH: CPT | Mod: ORL | Performed by: OBSTETRICS & GYNECOLOGY

## 2025-03-12 PROCEDURE — 86787 VARICELLA-ZOSTER ANTIBODY: CPT | Mod: ORL | Performed by: OBSTETRICS & GYNECOLOGY

## 2025-03-12 PROCEDURE — 83036 HEMOGLOBIN GLYCOSYLATED A1C: CPT | Mod: ORL | Performed by: OBSTETRICS & GYNECOLOGY

## 2025-03-12 PROCEDURE — 86765 RUBEOLA ANTIBODY: CPT | Mod: ORL | Performed by: OBSTETRICS & GYNECOLOGY

## 2025-03-13 LAB
MEV IGG SER IA-ACNC: >300 AU/ML
MEV IGG SER IA-ACNC: POSITIVE
RUBV IGG SERPL QL IA: 5.49 INDEX
RUBV IGG SERPL QL IA: POSITIVE
TSH SERPL DL<=0.005 MIU/L-ACNC: 3.59 UIU/ML (ref 0.3–4.2)
VZV IGG SER QL IA: 12.9 S/CO
VZV IGG SER QL IA: POSITIVE

## 2025-03-17 LAB — THYROPEROXIDASE AB SERPL-ACNC: <10 IU/ML

## 2025-04-10 ENCOUNTER — TRANSCRIBE ORDERS (OUTPATIENT)
Dept: OTHER | Age: 30
End: 2025-04-10

## 2025-04-10 ENCOUNTER — MEDICAL CORRESPONDENCE (OUTPATIENT)
Dept: HEALTH INFORMATION MANAGEMENT | Facility: CLINIC | Age: 30
End: 2025-04-10
Payer: COMMERCIAL

## 2025-04-10 DIAGNOSIS — E23.7 DISORDER OF PITUITARY GLAND, UNSPECIFIED: Primary | ICD-10-CM

## 2025-04-15 ENCOUNTER — TELEPHONE (OUTPATIENT)
Dept: ENDOCRINOLOGY | Facility: CLINIC | Age: 30
End: 2025-04-15
Payer: COMMERCIAL

## 2025-04-15 NOTE — TELEPHONE ENCOUNTER
Patient Contacted for the patient to call back and schedule the following:    Appointment type: New Pituitary   Provider: any MD that sees pituitary pt's   Return date: next avail   Specialty phone number: [phone]   Additional appointment(s) needed:   Additonal Notes: Spoke to pt and offered next avail in Dec 2025. Pt declined says she needs to be seen soon due to attempting to be pregnant. Pt will ask referring provider for referral to outside clinic. Sent message to nurses so they are aware e consult will not be needed  Beverly Borges RN  [id number]  No results available for triage.  Please help schedule first available with any provider who manages pituitary.  Please notify nursing when scheduled and we will send e-consult options.  Thank you.    Kristen Barker on 4/15/2025 at 3:10 PM

## 2025-07-19 ENCOUNTER — HEALTH MAINTENANCE LETTER (OUTPATIENT)
Age: 30
End: 2025-07-19

## (undated) DEVICE — SOL NACL 0.9% IRRIG 1000ML BOTTLE 07138-09

## (undated) DEVICE — Device

## (undated) DEVICE — BUR ARTHREX COOLCUT EXCALIBUR 4.0MMX13CM AR-8400EX

## (undated) DEVICE — CAST PADDING 4" STERILE 9044S

## (undated) DEVICE — DRSG STERI STRIP 1/2X4" R1547

## (undated) DEVICE — DRAPE BACK TABLE  44X90" 8377

## (undated) DEVICE — PREP CHLORAPREP 26ML TINTED ORANGE  260815

## (undated) DEVICE — DRSG GAUZE 4X4" TRAY

## (undated) DEVICE — SUCTION TIP POOLE K770

## (undated) DEVICE — GOWN IMPERVIOUS SPECIALTY XLG/XLONG 32474

## (undated) DEVICE — TUBING ARTHROSCOPY PUMP ARTHREX AR-6410

## (undated) DEVICE — STOCKING SLEEVE COMPRESSION CALF MED

## (undated) DEVICE — SU MONOCRYL 3-0 PS-2 18" UND Y497G

## (undated) DEVICE — SU ETHIBOND 0 CT-1 30" X424H

## (undated) DEVICE — GOWN XLG DISP 9545

## (undated) DEVICE — BLADE KNIFE SURG 15 371115

## (undated) DEVICE — SOL NACL 0.9% IRRIG 3000ML BAG 07972-08

## (undated) DEVICE — BNDG COBAN 6"X5YDS STERILE

## (undated) DEVICE — BNDG ELASTIC 6" DBL LENGTH UNSTERILE 6611-16

## (undated) DEVICE — SUCTION MANIFOLD NEPTUNE 2 SYS 4 PORT 0702-020-000

## (undated) DEVICE — ABLATOR ARTHREX APOLLO RF MP90 ASPIRATING 90DEG AR-9811

## (undated) DEVICE — GLOVE PROTEXIS W/NEU-THERA 8.0  2D73TE80

## (undated) DEVICE — DECANTER VIAL 2006S

## (undated) DEVICE — SU MONOCRYL 2-0 CT-1 36" UND Y945H

## (undated) DEVICE — DRSG ADAPTIC 3X8" X3

## (undated) DEVICE — GLOVE PROTEXIS BLUE W/NEU-THERA 6.5  2D73EB65

## (undated) DEVICE — PACK ARTHROSCOPY KNEE LATEX FREE SOP32CAFCN

## (undated) DEVICE — DRSG ABDOMINAL 07 1/2X8" 7197D

## (undated) DEVICE — GLOVE PROTEXIS W/NEU-THERA 6.5  2D73TE65

## (undated) DEVICE — ESU PENCIL W/COATED BLADE E2450H

## (undated) DEVICE — DRAPE SHEET REV FOLD 3/4 9349

## (undated) RX ORDER — LIDOCAINE HCL/EPINEPHRINE/PF 2%-1:200K
VIAL (ML) INJECTION
Status: DISPENSED
Start: 2022-10-06

## (undated) RX ORDER — CEFAZOLIN SODIUM/WATER 3 G/30 ML
SYRINGE (ML) INTRAVENOUS
Status: DISPENSED
Start: 2022-10-06

## (undated) RX ORDER — ROPIVACAINE HYDROCHLORIDE 5 MG/ML
INJECTION, SOLUTION EPIDURAL; INFILTRATION; PERINEURAL
Status: DISPENSED
Start: 2022-10-06

## (undated) RX ORDER — DEXAMETHASONE SODIUM PHOSPHATE 4 MG/ML
INJECTION, SOLUTION INTRA-ARTICULAR; INTRALESIONAL; INTRAMUSCULAR; INTRAVENOUS; SOFT TISSUE
Status: DISPENSED
Start: 2022-10-06

## (undated) RX ORDER — FENTANYL CITRATE 50 UG/ML
INJECTION, SOLUTION INTRAMUSCULAR; INTRAVENOUS
Status: DISPENSED
Start: 2022-10-06

## (undated) RX ORDER — KETOROLAC TROMETHAMINE 30 MG/ML
INJECTION, SOLUTION INTRAMUSCULAR; INTRAVENOUS
Status: DISPENSED
Start: 2022-10-06

## (undated) RX ORDER — DEXMEDETOMIDINE HYDROCHLORIDE 100 UG/ML
INJECTION, SOLUTION INTRAVENOUS
Status: DISPENSED
Start: 2022-10-06

## (undated) RX ORDER — HYDROMORPHONE HCL IN WATER/PF 6 MG/30 ML
PATIENT CONTROLLED ANALGESIA SYRINGE INTRAVENOUS
Status: DISPENSED
Start: 2022-10-06

## (undated) RX ORDER — HYDROXYZINE HYDROCHLORIDE 50 MG/1
TABLET, FILM COATED ORAL
Status: DISPENSED
Start: 2022-10-06

## (undated) RX ORDER — ACETAMINOPHEN 325 MG/1
TABLET ORAL
Status: DISPENSED
Start: 2022-10-06

## (undated) RX ORDER — LIDOCAINE HYDROCHLORIDE AND EPINEPHRINE 10; 10 MG/ML; UG/ML
INJECTION, SOLUTION INFILTRATION; PERINEURAL
Status: DISPENSED
Start: 2022-10-06

## (undated) RX ORDER — PROPOFOL 10 MG/ML
INJECTION, EMULSION INTRAVENOUS
Status: DISPENSED
Start: 2022-10-06

## (undated) RX ORDER — ONDANSETRON 2 MG/ML
INJECTION INTRAMUSCULAR; INTRAVENOUS
Status: DISPENSED
Start: 2022-10-06

## (undated) RX ORDER — LIDOCAINE HYDROCHLORIDE 10 MG/ML
INJECTION, SOLUTION EPIDURAL; INFILTRATION; INTRACAUDAL; PERINEURAL
Status: DISPENSED
Start: 2022-10-06